# Patient Record
Sex: FEMALE | Race: WHITE | NOT HISPANIC OR LATINO | Employment: FULL TIME | URBAN - METROPOLITAN AREA
[De-identification: names, ages, dates, MRNs, and addresses within clinical notes are randomized per-mention and may not be internally consistent; named-entity substitution may affect disease eponyms.]

---

## 2017-03-06 RX ORDER — LISINOPRIL 20 MG/1
TABLET ORAL
Qty: 90 TABLET | Refills: 0 | OUTPATIENT
Start: 2017-03-06

## 2017-03-09 ENCOUNTER — OFFICE VISIT (OUTPATIENT)
Dept: FAMILY MEDICINE CLINIC | Facility: CLINIC | Age: 52
End: 2017-03-09

## 2017-03-09 VITALS
BODY MASS INDEX: 20.83 KG/M2 | HEIGHT: 69 IN | DIASTOLIC BLOOD PRESSURE: 70 MMHG | RESPIRATION RATE: 16 BRPM | SYSTOLIC BLOOD PRESSURE: 110 MMHG | WEIGHT: 140.6 LBS | HEART RATE: 84 BPM | OXYGEN SATURATION: 98 %

## 2017-03-09 DIAGNOSIS — F41.9 ANXIETY: ICD-10-CM

## 2017-03-09 DIAGNOSIS — I10 ESSENTIAL HYPERTENSION: Primary | ICD-10-CM

## 2017-03-09 PROCEDURE — 99213 OFFICE O/P EST LOW 20 MIN: CPT | Performed by: FAMILY MEDICINE

## 2017-03-09 RX ORDER — LISINOPRIL 20 MG/1
20 TABLET ORAL DAILY
Qty: 90 TABLET | Refills: 3 | Status: SHIPPED | OUTPATIENT
Start: 2017-03-09 | End: 2018-02-21 | Stop reason: SDUPTHER

## 2017-03-09 NOTE — PROGRESS NOTES
"Subjective   Vesna Carl is a 51 y.o. female.     Hypertension   This is a chronic problem. The problem is controlled. Associated symptoms include anxiety. Pertinent negatives include no chest pain, headaches, palpitations, peripheral edema or shortness of breath. Risk factors for coronary artery disease include post-menopausal state and stress. Past treatments include ACE inhibitors. The current treatment provides significant improvement. Compliance problems include exercise.  There is no history of angina, kidney disease or CAD/MI.   Anxiety   Presents for follow-up visit. Symptoms include excessive worry and nervous/anxious behavior. Patient reports no chest pain, confusion, decreased concentration, dizziness, muscle tension, nausea, palpitations, shortness of breath or suicidal ideas. Symptoms occur occasionally. The severity of symptoms is moderate (Recently learned her father has terminal cancer). The quality of sleep is fair. Nighttime awakenings: occasional.            The following portions of the patient's history were reviewed and updated as appropriate: allergies, current medications, past social history and problem list.    Review of Systems   Constitutional: Negative for appetite change, fatigue and unexpected weight change.   Respiratory: Negative for cough, chest tightness and shortness of breath.    Cardiovascular: Negative for chest pain, palpitations and leg swelling.   Gastrointestinal: Negative for abdominal pain, diarrhea and nausea.   Skin: Negative for color change and rash.   Neurological: Negative for dizziness, tremors, syncope, weakness, light-headedness and headaches.   Psychiatric/Behavioral: Negative for agitation, behavioral problems, confusion, decreased concentration, dysphoric mood, sleep disturbance and suicidal ideas. The patient is nervous/anxious.        Objective   Visit Vitals   • /70   • Pulse 84   • Resp 16   • Ht 69\" (175.3 cm)   • Wt 140 lb 9.6 oz (63.8 kg)   • SpO2 " 98%   • BMI 20.76 kg/m2     Physical Exam   Constitutional: She is oriented to person, place, and time. She appears well-developed and well-nourished. She is cooperative. No distress.   HENT:   Head: Normocephalic and atraumatic.   Right Ear: External ear normal.   Left Ear: External ear normal.   Nose: Nose normal.   Mouth/Throat: Oropharynx is clear and moist.   Eyes: Conjunctivae are normal. Pupils are equal, round, and reactive to light. No scleral icterus.   Neck: Neck supple. No JVD present. Carotid bruit is not present. No thyromegaly present.   Cardiovascular: Normal rate, regular rhythm, normal heart sounds and intact distal pulses.    No murmur heard.  Pulmonary/Chest: Effort normal and breath sounds normal. No respiratory distress. She has no rales. She exhibits no tenderness.   Abdominal: There is no hepatosplenomegaly.   Musculoskeletal: Normal range of motion.   Neurological: She is alert and oriented to person, place, and time.   No focal deficits no lateralizing signs   Skin: Skin is warm and dry. No rash noted. She is not diaphoretic.   Psychiatric: She has a normal mood and affect. Her behavior is normal. Cognition and memory are normal.   Nursing note and vitals reviewed.      Assessment/Plan   Problem List Items Addressed This Visit        Cardiovascular and Mediastinum    Essential hypertension - Primary    Relevant Medications    lisinopril (PRINIVIL,ZESTRIL) 20 MG tablet       Other    Anxiety    Relevant Medications    Vortioxetine HBr (TRINTELLIX) 10 MG tablet          New Medications Ordered This Visit   Medications   • lisinopril (PRINIVIL,ZESTRIL) 20 MG tablet     Sig: Take 1 tablet by mouth Daily.     Dispense:  90 tablet     Refill:  3   • Vortioxetine HBr (TRINTELLIX) 10 MG tablet     Sig: Take 10 mg by mouth Daily.     Dispense:  90 tablet     Refill:  1

## 2017-10-02 DIAGNOSIS — F41.9 ANXIETY: ICD-10-CM

## 2017-10-02 RX ORDER — VORTIOXETINE 10 MG/1
TABLET, FILM COATED ORAL
Qty: 90 TABLET | Refills: 1 | OUTPATIENT
Start: 2017-10-02

## 2017-10-03 DIAGNOSIS — F41.9 ANXIETY: ICD-10-CM

## 2017-10-03 RX ORDER — VORTIOXETINE 10 MG/1
TABLET, FILM COATED ORAL
Qty: 90 TABLET | Refills: 1 | OUTPATIENT
Start: 2017-10-03

## 2017-10-10 ENCOUNTER — OFFICE VISIT (OUTPATIENT)
Dept: FAMILY MEDICINE CLINIC | Facility: CLINIC | Age: 52
End: 2017-10-10

## 2017-10-10 VITALS
BODY MASS INDEX: 21.42 KG/M2 | WEIGHT: 144.6 LBS | DIASTOLIC BLOOD PRESSURE: 78 MMHG | HEART RATE: 73 BPM | OXYGEN SATURATION: 99 % | HEIGHT: 69 IN | SYSTOLIC BLOOD PRESSURE: 116 MMHG | RESPIRATION RATE: 16 BRPM

## 2017-10-10 DIAGNOSIS — I10 ESSENTIAL HYPERTENSION: Primary | ICD-10-CM

## 2017-10-10 DIAGNOSIS — F41.9 ANXIETY: ICD-10-CM

## 2017-10-10 PROCEDURE — 99213 OFFICE O/P EST LOW 20 MIN: CPT | Performed by: FAMILY MEDICINE

## 2017-10-10 RX ORDER — LORAZEPAM 1 MG/1
1 TABLET ORAL 2 TIMES DAILY PRN
Qty: 60 TABLET | Refills: 1
Start: 2017-10-10 | End: 2018-02-20 | Stop reason: SDUPTHER

## 2017-10-11 NOTE — PROGRESS NOTES
Subjective   Vesna Carl is a 51 y.o. female.     Hypertension   This is a chronic problem. The current episode started more than 1 year ago. The problem is controlled. Associated symptoms include anxiety. Pertinent negatives include no chest pain, headaches, palpitations, peripheral edema or shortness of breath. Risk factors for coronary artery disease include post-menopausal state and stress. Past treatments include ACE inhibitors. The current treatment provides significant improvement. Compliance problems include exercise.  There is no history of angina, kidney disease or CAD/MI.   Anxiety   Presents for follow-up (feeling better recent stress level improving) visit. Symptoms include nervous/anxious behavior. Patient reports no chest pain, confusion, decreased concentration, dizziness, excessive worry, muscle tension, nausea, palpitations, shortness of breath or suicidal ideas. Symptoms occur occasionally. The severity of symptoms is moderate (Recently learned her father has terminal cancer). The quality of sleep is fair. Nighttime awakenings: occasional.            The following portions of the patient's history were reviewed and updated as appropriate: allergies, current medications, past social history and problem list.    Review of Systems   Constitutional: Negative for appetite change, fatigue and unexpected weight change.   Respiratory: Negative for cough, chest tightness and shortness of breath.    Cardiovascular: Negative for chest pain, palpitations and leg swelling.   Gastrointestinal: Negative for abdominal pain, diarrhea and nausea.   Skin: Negative for color change and rash.   Neurological: Negative for dizziness, tremors, syncope, weakness, light-headedness and headaches.   Psychiatric/Behavioral: Positive for dysphoric mood and sleep disturbance. Negative for agitation, behavioral problems, confusion, decreased concentration and suicidal ideas. The patient is nervous/anxious.        Objective   BP  "116/78  Pulse 73  Resp 16  Ht 69\" (175.3 cm)  Wt 144 lb 9.6 oz (65.6 kg)  SpO2 99%  BMI 21.35 kg/m2  Physical Exam   Constitutional: She is oriented to person, place, and time. She appears well-developed and well-nourished. She is cooperative.   HENT:   Head: Normocephalic and atraumatic.   Eyes: Conjunctivae are normal. Pupils are equal, round, and reactive to light. No scleral icterus.   Neck: Neck supple. Carotid bruit is not present. No thyromegaly present.   Cardiovascular: Normal rate, regular rhythm and normal heart sounds.    Pulmonary/Chest: Effort normal and breath sounds normal.   Abdominal: There is no hepatosplenomegaly.   Musculoskeletal: Normal range of motion. She exhibits no edema.   Neurological: She is alert and oriented to person, place, and time.   No focal deficits no lateralizing signs   Skin: Skin is warm and dry. No rash noted.   Psychiatric: She has a normal mood and affect. Her behavior is normal. Cognition and memory are normal.   Nursing note and vitals reviewed.      Assessment/Plan   Problem List Items Addressed This Visit     Essential hypertension - Primary    Anxiety    Relevant Medications    Vortioxetine HBr (TRINTELLIX) 10 MG tablet          New Medications Ordered This Visit   Medications   • Vortioxetine HBr (TRINTELLIX) 10 MG tablet     Sig: Take 10 mg by mouth Daily.     Dispense:  90 tablet     Refill:  1   • LORazepam (ATIVAN) 1 MG tablet     Sig: Take 1 tablet by mouth 2 (Two) Times a Day As Needed for Anxiety.     Dispense:  60 tablet     Refill:  1     RTC FASTING         "

## 2017-10-13 ENCOUNTER — TELEPHONE (OUTPATIENT)
Dept: FAMILY MEDICINE CLINIC | Facility: CLINIC | Age: 52
End: 2017-10-13

## 2017-10-13 NOTE — TELEPHONE ENCOUNTER
----- Message from Michelle Azevedo sent at 10/12/2017  4:49 PM EDT -----  Contact: [phone]  [facility] [place]   Patient pharmacy needs a PA for vortioxetine for her. Has that been started?    PA was approved called informed pt and pharmacy.  RODRÍGUEZ ALVARADO

## 2018-02-20 ENCOUNTER — OFFICE VISIT (OUTPATIENT)
Dept: FAMILY MEDICINE CLINIC | Facility: CLINIC | Age: 53
End: 2018-02-20

## 2018-02-20 VITALS
RESPIRATION RATE: 16 BRPM | WEIGHT: 139 LBS | DIASTOLIC BLOOD PRESSURE: 80 MMHG | HEIGHT: 69 IN | SYSTOLIC BLOOD PRESSURE: 112 MMHG | OXYGEN SATURATION: 98 % | HEART RATE: 62 BPM | BODY MASS INDEX: 20.59 KG/M2

## 2018-02-20 DIAGNOSIS — I10 ESSENTIAL HYPERTENSION: Primary | ICD-10-CM

## 2018-02-20 DIAGNOSIS — F41.9 ANXIETY: ICD-10-CM

## 2018-02-20 PROCEDURE — 99213 OFFICE O/P EST LOW 20 MIN: CPT | Performed by: FAMILY MEDICINE

## 2018-02-20 RX ORDER — LORAZEPAM 1 MG/1
1 TABLET ORAL 2 TIMES DAILY PRN
Qty: 60 TABLET | Refills: 1
Start: 2018-02-20 | End: 2018-10-09 | Stop reason: SDUPTHER

## 2018-02-20 NOTE — PROGRESS NOTES
Subjective   Vesna Carl is a 52 y.o. female.     Hypertension   This is a chronic problem. The current episode started more than 1 year ago. The problem is unchanged. The problem is controlled. Associated symptoms include anxiety. Pertinent negatives include no chest pain, headaches, malaise/fatigue, palpitations, peripheral edema or shortness of breath. Risk factors for coronary artery disease include post-menopausal state and stress. Past treatments include ACE inhibitors. The current treatment provides significant improvement. There are no compliance problems.  There is no history of angina, kidney disease or CAD/MI.   Anxiety   Presents for follow-up (Continuing to do well is considering coming off  an indication.) visit. Symptoms include nervous/anxious behavior. Patient reports no chest pain, confusion, decreased concentration, dizziness, excessive worry, muscle tension, nausea, palpitations, shortness of breath or suicidal ideas. Symptoms occur occasionally. The severity of symptoms is moderate (Recently learned her father has terminal cancer). The quality of sleep is fair. Nighttime awakenings: occasional.            The following portions of the patient's history were reviewed and updated as appropriate: allergies, current medications, past social history and problem list.    Review of Systems   Constitutional: Negative for appetite change, fatigue, malaise/fatigue and unexpected weight change.   HENT: Negative for congestion and sinus pressure.    Respiratory: Negative for cough, chest tightness and shortness of breath.    Cardiovascular: Negative for chest pain, palpitations and leg swelling.   Gastrointestinal: Negative for abdominal pain, diarrhea and nausea.   Genitourinary: Negative for dysuria and hematuria.   Skin: Negative for color change and rash.   Neurological: Negative for dizziness, tremors, syncope, weakness, light-headedness and headaches.   Psychiatric/Behavioral: Positive for dysphoric  "mood and sleep disturbance. Negative for agitation, behavioral problems, confusion, decreased concentration and suicidal ideas. The patient is nervous/anxious.        Objective   /80  Pulse 62  Resp 16  Ht 175.3 cm (69\")  Wt 63 kg (139 lb)  SpO2 98%  BMI 20.53 kg/m2  Physical Exam   Constitutional: She is oriented to person, place, and time. She appears well-developed and well-nourished. She is cooperative.   HENT:   Head: Normocephalic.   Right Ear: External ear normal.   Left Ear: External ear normal.   Nose: Nose normal.   Mouth/Throat: Oropharynx is clear and moist.   Eyes: Conjunctivae are normal. Pupils are equal, round, and reactive to light. No scleral icterus.   Neck: Neck supple. Carotid bruit is not present. No thyromegaly present.   Cardiovascular: Normal rate and regular rhythm.    Pulmonary/Chest: Effort normal and breath sounds normal.   Abdominal: There is no hepatosplenomegaly.   Musculoskeletal: Normal range of motion.   Neurological: She is alert and oriented to person, place, and time.   No focal deficits no lateralizing signs   Skin: Skin is warm and dry. No rash noted.   Psychiatric: She has a normal mood and affect. Her behavior is normal. Judgment and thought content normal. Cognition and memory are normal.   Nursing note and vitals reviewed.      Assessment/Plan   Problem List Items Addressed This Visit     Essential hypertension - Primary    Anxiety    Relevant Medications    Vortioxetine HBr (TRINTELLIX) 10 MG tablet          New Medications Ordered This Visit   Medications   • Vortioxetine HBr (TRINTELLIX) 10 MG tablet     Sig: Take 10 mg by mouth Daily.     Dispense:  90 tablet     Refill:  1   • LORazepam (ATIVAN) 1 MG tablet     Sig: Take 1 tablet by mouth 2 (Two) Times a Day As Needed for Anxiety.     Dispense:  60 tablet     Refill:  1       Patient is generally doing well a encouraged her to stay on her current medication.       "

## 2018-02-22 RX ORDER — LISINOPRIL 20 MG/1
TABLET ORAL
Qty: 90 TABLET | Refills: 1 | Status: SHIPPED | OUTPATIENT
Start: 2018-02-22 | End: 2018-08-23 | Stop reason: SDUPTHER

## 2018-08-23 RX ORDER — LISINOPRIL 20 MG/1
TABLET ORAL
Qty: 90 TABLET | Refills: 0 | Status: SHIPPED | OUTPATIENT
Start: 2018-08-23 | End: 2018-11-12 | Stop reason: SDUPTHER

## 2018-08-24 RX ORDER — LISINOPRIL 20 MG/1
TABLET ORAL
Qty: 90 TABLET | Refills: 1 | OUTPATIENT
Start: 2018-08-24

## 2018-09-21 DIAGNOSIS — F41.9 ANXIETY: ICD-10-CM

## 2018-09-21 RX ORDER — VORTIOXETINE 10 MG/1
TABLET, FILM COATED ORAL
Qty: 90 TABLET | Refills: 1 | OUTPATIENT
Start: 2018-09-21

## 2018-10-04 DIAGNOSIS — F41.9 ANXIETY: ICD-10-CM

## 2018-10-05 RX ORDER — VORTIOXETINE 10 MG/1
TABLET, FILM COATED ORAL
Qty: 90 TABLET | Refills: 1 | OUTPATIENT
Start: 2018-10-05

## 2018-10-09 ENCOUNTER — OFFICE VISIT (OUTPATIENT)
Dept: FAMILY MEDICINE CLINIC | Facility: CLINIC | Age: 53
End: 2018-10-09

## 2018-10-09 VITALS
BODY MASS INDEX: 20.86 KG/M2 | WEIGHT: 140.8 LBS | OXYGEN SATURATION: 98 % | HEART RATE: 75 BPM | DIASTOLIC BLOOD PRESSURE: 78 MMHG | RESPIRATION RATE: 16 BRPM | HEIGHT: 69 IN | SYSTOLIC BLOOD PRESSURE: 116 MMHG

## 2018-10-09 DIAGNOSIS — F41.9 ANXIETY: ICD-10-CM

## 2018-10-09 DIAGNOSIS — I10 ESSENTIAL HYPERTENSION: Primary | ICD-10-CM

## 2018-10-09 LAB
ALBUMIN SERPL-MCNC: 4.42 G/DL (ref 3.2–4.8)
ALBUMIN/GLOB SERPL: 1.9 G/DL (ref 1.5–2.5)
ALP SERPL-CCNC: 30 U/L (ref 25–100)
ALT SERPL W P-5'-P-CCNC: 16 U/L (ref 7–40)
ANION GAP SERPL CALCULATED.3IONS-SCNC: 6 MMOL/L (ref 3–11)
ARTICHOKE IGE QN: 109 MG/DL (ref 0–130)
AST SERPL-CCNC: 20 U/L (ref 0–33)
BASOPHILS # BLD AUTO: 0.04 10*3/MM3 (ref 0–0.2)
BASOPHILS NFR BLD AUTO: 0.7 % (ref 0–1)
BILIRUB SERPL-MCNC: 0.3 MG/DL (ref 0.3–1.2)
BUN BLD-MCNC: 11 MG/DL (ref 9–23)
BUN/CREAT SERPL: 13.8 (ref 7–25)
CALCIUM SPEC-SCNC: 9.2 MG/DL (ref 8.7–10.4)
CHLORIDE SERPL-SCNC: 107 MMOL/L (ref 99–109)
CHOLEST SERPL-MCNC: 189 MG/DL (ref 0–200)
CO2 SERPL-SCNC: 27 MMOL/L (ref 20–31)
CREAT BLD-MCNC: 0.8 MG/DL (ref 0.6–1.3)
DEPRECATED RDW RBC AUTO: 45.4 FL (ref 37–54)
EOSINOPHIL # BLD AUTO: 0.21 10*3/MM3 (ref 0–0.3)
EOSINOPHIL NFR BLD AUTO: 3.6 % (ref 0–3)
ERYTHROCYTE [DISTWIDTH] IN BLOOD BY AUTOMATED COUNT: 14.8 % (ref 11.3–14.5)
FSH SERPL-ACNC: 38.5 MIU/ML
GFR SERPL CREATININE-BSD FRML MDRD: 76 ML/MIN/1.73
GLOBULIN UR ELPH-MCNC: 2.4 GM/DL
GLUCOSE BLD-MCNC: 86 MG/DL (ref 70–100)
HCT VFR BLD AUTO: 35.6 % (ref 34.5–44)
HDLC SERPL-MCNC: 78 MG/DL (ref 40–60)
HGB BLD-MCNC: 11.3 G/DL (ref 11.5–15.5)
IMM GRANULOCYTES # BLD: 0.01 10*3/MM3 (ref 0–0.03)
IMM GRANULOCYTES NFR BLD: 0.2 % (ref 0–0.6)
LH SERPL-ACNC: 17.2 MIU/ML
LYMPHOCYTES # BLD AUTO: 1.39 10*3/MM3 (ref 0.6–4.8)
LYMPHOCYTES NFR BLD AUTO: 24.1 % (ref 24–44)
MCH RBC QN AUTO: 26.8 PG (ref 27–31)
MCHC RBC AUTO-ENTMCNC: 31.7 G/DL (ref 32–36)
MCV RBC AUTO: 84.4 FL (ref 80–99)
MONOCYTES # BLD AUTO: 0.5 10*3/MM3 (ref 0–1)
MONOCYTES NFR BLD AUTO: 8.7 % (ref 0–12)
NEUTROPHILS # BLD AUTO: 3.63 10*3/MM3 (ref 1.5–8.3)
NEUTROPHILS NFR BLD AUTO: 62.9 % (ref 41–71)
PLATELET # BLD AUTO: 399 10*3/MM3 (ref 150–450)
PMV BLD AUTO: 9.9 FL (ref 6–12)
POTASSIUM BLD-SCNC: 4.4 MMOL/L (ref 3.5–5.5)
PROT SERPL-MCNC: 6.8 G/DL (ref 5.7–8.2)
RBC # BLD AUTO: 4.22 10*6/MM3 (ref 3.89–5.14)
SODIUM BLD-SCNC: 140 MMOL/L (ref 132–146)
T4 FREE SERPL-MCNC: 1.07 NG/DL (ref 0.89–1.76)
TRIGL SERPL-MCNC: 58 MG/DL (ref 0–150)
TSH SERPL DL<=0.05 MIU/L-ACNC: 1.18 MIU/ML (ref 0.35–5.35)
WBC NRBC COR # BLD: 5.77 10*3/MM3 (ref 3.5–10.8)

## 2018-10-09 PROCEDURE — 84439 ASSAY OF FREE THYROXINE: CPT | Performed by: FAMILY MEDICINE

## 2018-10-09 PROCEDURE — 83001 ASSAY OF GONADOTROPIN (FSH): CPT | Performed by: FAMILY MEDICINE

## 2018-10-09 PROCEDURE — 36415 COLL VENOUS BLD VENIPUNCTURE: CPT | Performed by: FAMILY MEDICINE

## 2018-10-09 PROCEDURE — 84443 ASSAY THYROID STIM HORMONE: CPT | Performed by: FAMILY MEDICINE

## 2018-10-09 PROCEDURE — 90471 IMMUNIZATION ADMIN: CPT | Performed by: FAMILY MEDICINE

## 2018-10-09 PROCEDURE — 83002 ASSAY OF GONADOTROPIN (LH): CPT | Performed by: FAMILY MEDICINE

## 2018-10-09 PROCEDURE — 99213 OFFICE O/P EST LOW 20 MIN: CPT | Performed by: FAMILY MEDICINE

## 2018-10-09 PROCEDURE — 80053 COMPREHEN METABOLIC PANEL: CPT | Performed by: FAMILY MEDICINE

## 2018-10-09 PROCEDURE — 90632 HEPA VACCINE ADULT IM: CPT | Performed by: FAMILY MEDICINE

## 2018-10-09 PROCEDURE — 85025 COMPLETE CBC W/AUTO DIFF WBC: CPT | Performed by: FAMILY MEDICINE

## 2018-10-09 PROCEDURE — 80061 LIPID PANEL: CPT | Performed by: FAMILY MEDICINE

## 2018-10-09 RX ORDER — LORAZEPAM 1 MG/1
1 TABLET ORAL 2 TIMES DAILY PRN
Qty: 60 TABLET | Refills: 1
Start: 2018-10-09 | End: 2019-04-10 | Stop reason: SDUPTHER

## 2018-10-09 NOTE — PROGRESS NOTES
Subjective   Vesna Carl is a 52 y.o. female.     Hypertension   This is a chronic problem. The current episode started more than 1 year ago. The problem is unchanged. The problem is controlled. Associated symptoms include anxiety. Pertinent negatives include no chest pain, headaches, malaise/fatigue, palpitations, peripheral edema or shortness of breath. Risk factors for coronary artery disease include post-menopausal state and stress. Current antihypertension treatment includes ACE inhibitors. The current treatment provides significant improvement. There are no compliance problems.  There is no history of angina, kidney disease or CAD/MI.   Anxiety   Presents for follow-up (Continuing to do well ) visit. Symptoms include irritability and nervous/anxious behavior. Patient reports no chest pain, confusion, decreased concentration, dizziness, excessive worry, muscle tension, nausea, palpitations, shortness of breath or suicidal ideas. Symptoms occur occasionally. The severity of symptoms is moderate (Recently learned her father has terminal cancer). The quality of sleep is fair. Nighttime awakenings: occasional.            The following portions of the patient's history were reviewed and updated as appropriate: allergies, current medications, past social history and problem list.    Review of Systems   Constitutional: Positive for irritability. Negative for fatigue, malaise/fatigue and unexpected weight change.   HENT: Negative for congestion and postnasal drip.    Respiratory: Negative for cough, chest tightness and shortness of breath.    Cardiovascular: Negative for chest pain, palpitations and leg swelling.   Gastrointestinal: Negative for nausea.   Endocrine: Positive for heat intolerance.   Genitourinary: Negative for dysuria and hematuria.   Musculoskeletal: Negative for arthralgias and myalgias.   Skin: Negative for color change and rash.   Neurological: Negative for dizziness, syncope, weakness and headaches.  "  Psychiatric/Behavioral: Negative for confusion, decreased concentration and suicidal ideas. The patient is nervous/anxious.         Traveling to [place] and [place] for work       Objective   /78   Pulse 75   Resp 16   Ht 175.3 cm (69\")   Wt 63.9 kg (140 lb 12.8 oz)   SpO2 98%   BMI 20.79 kg/m²   Physical Exam   Constitutional: She is oriented to person, place, and time. She appears well-developed and well-nourished. She is cooperative.   HENT:   Head: Normocephalic.   Right Ear: External ear normal.   Left Ear: External ear normal.   Nose: Nose normal.   Mouth/Throat: Oropharynx is clear and moist.   Eyes: Pupils are equal, round, and reactive to light. Conjunctivae are normal. No scleral icterus.   Neck: Neck supple. Carotid bruit is not present. No thyromegaly present.   Cardiovascular: Normal rate, regular rhythm and normal heart sounds.    Pulmonary/Chest: Effort normal and breath sounds normal. She has no wheezes. She has no rales.   Abdominal: There is no hepatosplenomegaly.   Musculoskeletal: Normal range of motion.   Neurological: She is alert and oriented to person, place, and time.   No focal deficits no lateralizing signs   Skin: Skin is warm and dry. No rash noted.   Psychiatric: She has a normal mood and affect. Her behavior is normal. Judgment and thought content normal. Cognition and memory are normal.   Nursing note and vitals reviewed.      Assessment/Plan   Problem List Items Addressed This Visit     Essential hypertension - Primary    Relevant Orders    Comprehensive Metabolic Panel    Lipid Panel    TSH    CBC & Differential    T4, Free    FSH & LH    CBC Auto Differential    Anxiety    Relevant Orders    FSH & LH          No orders of the defined types were placed in this encounter.    Follow up soon with her gynecologist she is a good candidate for hormone replacement therapy in my opinion.  She agreed to schedule a colonoscopy after she returns.  Hepatitis A #1 given " today.Flu vaccine at work.

## 2018-10-12 DIAGNOSIS — F41.9 ANXIETY: ICD-10-CM

## 2018-11-13 RX ORDER — LISINOPRIL 20 MG/1
TABLET ORAL
Qty: 90 TABLET | Refills: 1 | Status: SHIPPED | OUTPATIENT
Start: 2018-11-13 | End: 2019-04-10 | Stop reason: SDUPTHER

## 2019-04-10 ENCOUNTER — OFFICE VISIT (OUTPATIENT)
Dept: FAMILY MEDICINE CLINIC | Facility: CLINIC | Age: 54
End: 2019-04-10

## 2019-04-10 VITALS
HEART RATE: 73 BPM | RESPIRATION RATE: 16 BRPM | SYSTOLIC BLOOD PRESSURE: 120 MMHG | BODY MASS INDEX: 21.38 KG/M2 | OXYGEN SATURATION: 99 % | WEIGHT: 144.38 LBS | TEMPERATURE: 97.6 F | HEIGHT: 69 IN | DIASTOLIC BLOOD PRESSURE: 90 MMHG

## 2019-04-10 DIAGNOSIS — I10 ESSENTIAL HYPERTENSION: Primary | ICD-10-CM

## 2019-04-10 DIAGNOSIS — F41.9 ANXIETY: ICD-10-CM

## 2019-04-10 PROCEDURE — 99214 OFFICE O/P EST MOD 30 MIN: CPT | Performed by: FAMILY MEDICINE

## 2019-04-10 RX ORDER — LORAZEPAM 1 MG/1
1 TABLET ORAL 2 TIMES DAILY PRN
Qty: 60 TABLET | Refills: 1
Start: 2019-04-10 | End: 2020-01-09 | Stop reason: SDUPTHER

## 2019-04-10 RX ORDER — LISINOPRIL 20 MG/1
20 TABLET ORAL DAILY
Qty: 90 TABLET | Refills: 3 | Status: SHIPPED | OUTPATIENT
Start: 2019-04-10 | End: 2020-01-09

## 2019-04-10 NOTE — PROGRESS NOTES
Subjective   Vesna Carl is a 53 y.o. female.     Hypertension   This is a chronic problem. The current episode started more than 1 year ago. The problem is unchanged. The problem is controlled. Associated symptoms include anxiety. Pertinent negatives include no chest pain, headaches, malaise/fatigue, palpitations, peripheral edema or shortness of breath. Risk factors for coronary artery disease include post-menopausal state and stress. Current antihypertension treatment includes ACE inhibitors. The current treatment provides significant improvement. There are no compliance problems.  There is no history of angina, kidney disease or CAD/MI.   Anxiety   Presents for follow-up (Continuing to do well ) visit. Symptoms include irritability and nervous/anxious behavior. Patient reports no chest pain, confusion, decreased concentration, dizziness, excessive worry, muscle tension, nausea, palpitations, shortness of breath or suicidal ideas. Symptoms occur occasionally. The severity of symptoms is moderate (Recently learned her father has terminal cancer). The quality of sleep is fair. Nighttime awakenings: occasional.            The following portions of the patient's history were reviewed and updated as appropriate: allergies, current medications, past social history and problem list.    Review of Systems   Constitutional: Positive for irritability. Negative for activity change, fatigue and malaise/fatigue.   HENT: Negative for congestion and sore throat.    Eyes: Negative for pain and visual disturbance.   Respiratory: Negative for chest tightness and shortness of breath.    Cardiovascular: Negative for chest pain, palpitations and leg swelling.   Gastrointestinal: Negative for diarrhea, nausea and vomiting.   Genitourinary: Negative for dysuria and hematuria.   Neurological: Negative for dizziness, syncope and headaches.   Psychiatric/Behavioral: Negative for confusion, decreased concentration and suicidal ideas. The  "patient is nervous/anxious.        Objective   /90   Pulse 73   Temp 97.6 °F (36.4 °C)   Resp 16   Ht 175.3 cm (69.02\")   Wt 65.5 kg (144 lb 6 oz)   LMP 03/06/2019 (Approximate)   SpO2 99%   BMI 21.31 kg/m²   Physical Exam   Constitutional: She is oriented to person, place, and time. She appears well-developed and well-nourished. She is cooperative.   HENT:   Head: Normocephalic.   Right Ear: External ear normal.   Left Ear: External ear normal.   Nose: Nose normal.   Mouth/Throat: Oropharynx is clear and moist.   Eyes: Conjunctivae are normal. Pupils are equal, round, and reactive to light. No scleral icterus.   Neck: Neck supple. No JVD present. Carotid bruit is not present. No thyromegaly present.   Cardiovascular: Normal rate, regular rhythm and normal heart sounds.   Pulmonary/Chest: Effort normal and breath sounds normal.   Abdominal: There is no hepatosplenomegaly.   Musculoskeletal: Normal range of motion.   Lymphadenopathy:     She has no cervical adenopathy.   Neurological: She is alert and oriented to person, place, and time. She displays normal reflexes. No cranial nerve deficit. Coordination normal.   No focal deficits no lateralizing signs   Skin: Skin is warm and dry. No rash noted.   Psychiatric: She has a normal mood and affect. Cognition and memory are normal.   Nursing note and vitals reviewed.      Assessment/Plan   Problem List Items Addressed This Visit        Active Problems    Essential hypertension - Primary    Relevant Medications    lisinopril (PRINIVIL,ZESTRIL) 20 MG tablet    LORazepam (ATIVAN) 1 MG tablet    Anxiety    Relevant Medications    Vortioxetine HBr (TRINTELLIX) 10 MG tablet    LORazepam (ATIVAN) 1 MG tablet          New Medications Ordered This Visit   Medications   • lisinopril (PRINIVIL,ZESTRIL) 20 MG tablet     Sig: Take 1 tablet by mouth Daily.     Dispense:  90 tablet     Refill:  3   • Vortioxetine HBr (TRINTELLIX) 10 MG tablet     Sig: Take 10 mg by mouth " Daily.     Dispense:  90 tablet     Refill:  1   • LORazepam (ATIVAN) 1 MG tablet     Sig: Take 1 tablet by mouth 2 (Two) Times a Day As Needed for Anxiety.     Dispense:  60 tablet     Refill:  1

## 2019-10-06 DIAGNOSIS — F41.9 ANXIETY: ICD-10-CM

## 2019-10-07 RX ORDER — VORTIOXETINE 10 MG/1
TABLET, FILM COATED ORAL
Qty: 90 TABLET | Refills: 0 | Status: SHIPPED | OUTPATIENT
Start: 2019-10-07 | End: 2019-10-11 | Stop reason: SDUPTHER

## 2019-10-10 DIAGNOSIS — F41.9 ANXIETY: ICD-10-CM

## 2019-10-11 RX ORDER — VORTIOXETINE 10 MG/1
TABLET, FILM COATED ORAL
Qty: 90 TABLET | Refills: 0 | Status: SHIPPED | OUTPATIENT
Start: 2019-10-11 | End: 2020-01-09 | Stop reason: SDUPTHER

## 2020-01-09 ENCOUNTER — OFFICE VISIT (OUTPATIENT)
Dept: FAMILY MEDICINE CLINIC | Facility: CLINIC | Age: 55
End: 2020-01-09

## 2020-01-09 VITALS
SYSTOLIC BLOOD PRESSURE: 132 MMHG | DIASTOLIC BLOOD PRESSURE: 88 MMHG | HEART RATE: 80 BPM | WEIGHT: 146.6 LBS | BODY MASS INDEX: 21.71 KG/M2 | OXYGEN SATURATION: 98 % | HEIGHT: 69 IN | RESPIRATION RATE: 16 BRPM

## 2020-01-09 DIAGNOSIS — F41.9 ANXIETY: ICD-10-CM

## 2020-01-09 DIAGNOSIS — I10 ESSENTIAL HYPERTENSION: Primary | ICD-10-CM

## 2020-01-09 PROCEDURE — 99213 OFFICE O/P EST LOW 20 MIN: CPT | Performed by: FAMILY MEDICINE

## 2020-01-09 RX ORDER — LORAZEPAM 1 MG/1
1 TABLET ORAL 2 TIMES DAILY PRN
Qty: 60 TABLET | Refills: 2
Start: 2020-01-09 | End: 2020-07-15 | Stop reason: SDUPTHER

## 2020-01-09 RX ORDER — LISINOPRIL 40 MG/1
40 TABLET ORAL DAILY
Qty: 90 TABLET | Refills: 1 | Status: SHIPPED | OUTPATIENT
Start: 2020-01-09 | End: 2020-05-27

## 2020-01-09 NOTE — PROGRESS NOTES
Subjective   Vesna Carl is a 54 y.o. female.     Anxiety   Presents for follow-up (Continuing to do well ) visit. Symptoms include irritability and nervous/anxious behavior. Patient reports no chest pain, confusion, decreased concentration, dizziness, excessive worry, muscle tension, nausea, palpitations, shortness of breath or suicidal ideas. Symptoms occur occasionally. The severity of symptoms is moderate (Recently learned her father has terminal cancer). The quality of sleep is fair. Nighttime awakenings: occasional.       Hypertension   This is a chronic (Pressure readings are up at home systolic in the 150s) problem. The current episode started more than 1 year ago. The problem is unchanged. The problem is uncontrolled. Associated symptoms include anxiety. Pertinent negatives include no chest pain, headaches, malaise/fatigue, palpitations, peripheral edema or shortness of breath. Risk factors for coronary artery disease include post-menopausal state and stress. Current antihypertension treatment includes ACE inhibitors. The current treatment provides significant improvement. There are no compliance problems.  There is no history of angina, kidney disease or CAD/MI.        The following portions of the patient's history were reviewed and updated as appropriate: allergies, current medications, past social history and problem list.    Review of Systems   Constitutional: Positive for irritability. Negative for activity change, fatigue and malaise/fatigue.   HENT: Negative for congestion and sore throat.    Eyes: Negative for pain and visual disturbance.   Respiratory: Negative for chest tightness and shortness of breath.    Cardiovascular: Negative for chest pain, palpitations and leg swelling.   Gastrointestinal: Negative for diarrhea, nausea and vomiting.   Genitourinary: Negative for dysuria and hematuria.   Musculoskeletal: Negative for arthralgias and myalgias.   Neurological: Negative for dizziness, syncope  "and headaches.   Psychiatric/Behavioral: Negative for confusion, decreased concentration and suicidal ideas. The patient is nervous/anxious.        Objective   /88   Pulse 80   Resp 16   Ht 175.3 cm (69.02\")   Wt 66.5 kg (146 lb 9.6 oz)   SpO2 98%   BMI 21.64 kg/m²   Physical Exam   Constitutional: She is oriented to person, place, and time. She appears well-developed and well-nourished. She is cooperative.   HENT:   Head: Normocephalic.   Right Ear: External ear normal.   Left Ear: External ear normal.   Nose: Nose normal.   Mouth/Throat: Oropharynx is clear and moist.   Eyes: Pupils are equal, round, and reactive to light. Conjunctivae are normal. No scleral icterus.   Neck: Neck supple. No JVD present. Carotid bruit is not present. No thyromegaly present.   Cardiovascular: Normal rate, regular rhythm, normal heart sounds and intact distal pulses.   Pulmonary/Chest: Effort normal and breath sounds normal. She has no wheezes.   Abdominal: There is no hepatosplenomegaly.   Musculoskeletal: Normal range of motion. She exhibits no edema or tenderness.   Neurological: She is alert and oriented to person, place, and time.   No focal deficits no lateralizing signs   Skin: Skin is warm and dry. No rash noted.   Psychiatric: She has a normal mood and affect. Cognition and memory are normal.   Nursing note and vitals reviewed.      Assessment/Plan   Problem List Items Addressed This Visit        Active Problems    Essential hypertension - Primary    Relevant Medications    LORazepam (ATIVAN) 1 MG tablet    lisinopril (PRINIVIL,ZESTRIL) 40 MG tablet    Anxiety    Relevant Medications    LORazepam (ATIVAN) 1 MG tablet    Vortioxetine HBr (TRINTELLIX) 10 MG tablet          New Medications Ordered This Visit   Medications   • LORazepam (ATIVAN) 1 MG tablet     Sig: Take 1 tablet by mouth 2 (Two) Times a Day As Needed for Anxiety.     Dispense:  60 tablet     Refill:  2   • lisinopril (PRINIVIL,ZESTRIL) 40 MG tablet "     Sig: Take 1 tablet by mouth Daily.     Dispense:  90 tablet     Refill:  1   • Vortioxetine HBr (TRINTELLIX) 10 MG tablet     Sig: Take 10 mg by mouth Daily.     Dispense:  30 tablet     Refill:  5

## 2020-02-24 RX ORDER — LISINOPRIL 20 MG/1
TABLET ORAL
Qty: 90 TABLET | Refills: 3 | OUTPATIENT
Start: 2020-02-24

## 2020-05-27 RX ORDER — LISINOPRIL 40 MG/1
40 TABLET ORAL DAILY
Qty: 60 TABLET | Refills: 0 | Status: SHIPPED | OUTPATIENT
Start: 2020-05-27 | End: 2020-07-15

## 2020-07-10 DIAGNOSIS — F41.9 ANXIETY: ICD-10-CM

## 2020-07-10 RX ORDER — VORTIOXETINE 10 MG/1
TABLET, FILM COATED ORAL
Qty: 30 TABLET | Refills: 0 | Status: SHIPPED | OUTPATIENT
Start: 2020-07-10 | End: 2020-07-15 | Stop reason: SDUPTHER

## 2020-07-15 ENCOUNTER — OFFICE VISIT (OUTPATIENT)
Dept: FAMILY MEDICINE CLINIC | Facility: CLINIC | Age: 55
End: 2020-07-15

## 2020-07-15 VITALS
OXYGEN SATURATION: 96 % | WEIGHT: 151 LBS | SYSTOLIC BLOOD PRESSURE: 132 MMHG | TEMPERATURE: 97.4 F | DIASTOLIC BLOOD PRESSURE: 90 MMHG | HEART RATE: 76 BPM | RESPIRATION RATE: 18 BRPM | BODY MASS INDEX: 22.36 KG/M2 | HEIGHT: 69 IN

## 2020-07-15 DIAGNOSIS — I10 ESSENTIAL HYPERTENSION: ICD-10-CM

## 2020-07-15 DIAGNOSIS — F41.9 ANXIETY: ICD-10-CM

## 2020-07-15 PROCEDURE — 99213 OFFICE O/P EST LOW 20 MIN: CPT | Performed by: FAMILY MEDICINE

## 2020-07-15 RX ORDER — LISINOPRIL 40 MG/1
TABLET ORAL
Qty: 60 TABLET | Refills: 0 | Status: SHIPPED | OUTPATIENT
Start: 2020-07-15 | End: 2020-07-15 | Stop reason: SDUPTHER

## 2020-07-15 RX ORDER — LISINOPRIL 40 MG/1
40 TABLET ORAL DAILY
Qty: 90 TABLET | Refills: 3 | Status: SHIPPED | OUTPATIENT
Start: 2020-07-15 | End: 2021-08-10 | Stop reason: SDUPTHER

## 2020-07-15 RX ORDER — TRIAMTERENE AND HYDROCHLOROTHIAZIDE 37.5; 25 MG/1; MG/1
0.5 TABLET ORAL DAILY
Qty: 45 TABLET | Refills: 1 | Status: SHIPPED | OUTPATIENT
Start: 2020-07-15 | End: 2021-01-03

## 2020-07-15 RX ORDER — LORAZEPAM 1 MG/1
1 TABLET ORAL 2 TIMES DAILY PRN
Qty: 60 TABLET | Refills: 2 | Status: SHIPPED | OUTPATIENT
Start: 2020-07-15 | End: 2020-10-15 | Stop reason: SDUPTHER

## 2020-07-16 NOTE — PROGRESS NOTES
Subjective   Vesna Carl is a 54 y.o. female.     Anxiety   Presents for follow-up visit. Symptoms include irritability and nervous/anxious behavior. Patient reports no chest pain, confusion, decreased concentration, dizziness, excessive worry, muscle tension, nausea, palpitations, shortness of breath or suicidal ideas. Primary symptoms comment: Corona virus pandemic has increased stress at work. Symptoms occur occasionally. The severity of symptoms is moderate (Recently learned her father has terminal cancer). The quality of sleep is fair. Nighttime awakenings: occasional.       Hypertension   This is a chronic (Blood pressure readings have been creeping up has had to take some extra lisinopril) problem. The current episode started more than 1 year ago. The problem is unchanged. The problem is uncontrolled. Associated symptoms include anxiety. Pertinent negatives include no chest pain, headaches, malaise/fatigue, palpitations, peripheral edema or shortness of breath. Risk factors for coronary artery disease include post-menopausal state and stress. Current antihypertension treatment includes ACE inhibitors. The current treatment provides significant improvement. There are no compliance problems.  There is no history of angina, kidney disease or CAD/MI.        The following portions of the patient's history were reviewed and updated as appropriate: allergies, current medications, past social history and problem list.    Review of Systems   Constitutional: Positive for irritability. Negative for activity change, fatigue and malaise/fatigue.   Eyes: Negative for pain and visual disturbance.   Respiratory: Negative for chest tightness and shortness of breath.    Cardiovascular: Negative for chest pain, palpitations and leg swelling.   Gastrointestinal: Negative for diarrhea, nausea and vomiting.   Genitourinary: Negative for dysuria and hematuria.   Neurological: Negative for dizziness, syncope and headaches.  "  Psychiatric/Behavioral: Negative for confusion, decreased concentration and suicidal ideas. The patient is nervous/anxious.        Objective   /90   Pulse 76   Temp 97.4 °F (36.3 °C)   Resp 18   Ht 175.3 cm (69.02\")   Wt 68.5 kg (151 lb)   LMP 07/08/2020   SpO2 96%   Breastfeeding No   BMI 22.29 kg/m²   Physical Exam   Constitutional: She is oriented to person, place, and time. She appears well-developed and well-nourished. She is cooperative.   HENT:   Head: Normocephalic and atraumatic.   Eyes: Pupils are equal, round, and reactive to light. Conjunctivae are normal. No scleral icterus.   Neck: Neck supple. Carotid bruit is not present. No thyromegaly present.   Cardiovascular: Normal rate, regular rhythm and normal heart sounds.   Pulmonary/Chest: Effort normal and breath sounds normal.   Abdominal: There is no hepatosplenomegaly.   Musculoskeletal: Normal range of motion. She exhibits no edema.   Neurological: She is alert and oriented to person, place, and time.   No focal deficits no lateralizing signs   Skin: Skin is warm and dry. No rash noted.   Psychiatric: She has a normal mood and affect. Her behavior is normal. Cognition and memory are normal.   Nursing note and vitals reviewed.      Assessment/Plan   Problem List Items Addressed This Visit        Active Problems    Essential hypertension    Relevant Medications    triamterene-hydrochlorothiazide (MAXZIDE-25) 37.5-25 MG per tablet    LORazepam (ATIVAN) 1 MG tablet    lisinopril (PRINIVIL,ZESTRIL) 40 MG tablet    Anxiety    Relevant Medications    LORazepam (ATIVAN) 1 MG tablet    Vortioxetine HBr (Trintellix) 10 MG tablet          New Medications Ordered This Visit   Medications   • triamterene-hydrochlorothiazide (MAXZIDE-25) 37.5-25 MG per tablet     Sig: Take 0.5 tablets by mouth Daily.     Dispense:  45 tablet     Refill:  1   • LORazepam (ATIVAN) 1 MG tablet     Sig: Take 1 tablet by mouth 2 (Two) Times a Day As Needed for Anxiety. "     Dispense:  60 tablet     Refill:  2   • Vortioxetine HBr (Trintellix) 10 MG tablet     Sig: Take 10 mg by mouth Daily.     Dispense:  90 tablet     Refill:  1   • lisinopril (PRINIVIL,ZESTRIL) 40 MG tablet     Sig: Take 1 tablet by mouth Daily.     Dispense:  90 tablet     Refill:  3     Monitor blood pressure and record for next office visit.

## 2020-10-15 ENCOUNTER — OFFICE VISIT (OUTPATIENT)
Dept: FAMILY MEDICINE CLINIC | Facility: CLINIC | Age: 55
End: 2020-10-15

## 2020-10-15 VITALS
HEIGHT: 69 IN | TEMPERATURE: 98.4 F | BODY MASS INDEX: 21.3 KG/M2 | WEIGHT: 143.8 LBS | DIASTOLIC BLOOD PRESSURE: 70 MMHG | OXYGEN SATURATION: 98 % | HEART RATE: 81 BPM | SYSTOLIC BLOOD PRESSURE: 102 MMHG

## 2020-10-15 DIAGNOSIS — I10 ESSENTIAL HYPERTENSION: Primary | ICD-10-CM

## 2020-10-15 DIAGNOSIS — F41.9 ANXIETY: ICD-10-CM

## 2020-10-15 PROCEDURE — 99213 OFFICE O/P EST LOW 20 MIN: CPT | Performed by: FAMILY MEDICINE

## 2020-10-15 RX ORDER — LORAZEPAM 1 MG/1
1 TABLET ORAL 2 TIMES DAILY PRN
Qty: 60 TABLET | Refills: 3 | Status: SHIPPED | OUTPATIENT
Start: 2020-10-15 | End: 2021-06-28 | Stop reason: SDUPTHER

## 2020-10-15 NOTE — PROGRESS NOTES
Subjective   Vesna Carl is a 54 y.o. female.     Hypertension  This is a chronic (Blood pressure readings have been much improved lately) problem. The current episode started more than 1 year ago. The problem is unchanged. The problem is uncontrolled. Associated symptoms include anxiety. Pertinent negatives include no chest pain, headaches, malaise/fatigue, palpitations, peripheral edema or shortness of breath. Risk factors for coronary artery disease include post-menopausal state and stress. Current antihypertension treatment includes ACE inhibitors. The current treatment provides significant improvement. There are no compliance problems.  There is no history of angina, kidney disease or CAD/MI.   Anxiety  Presents for follow-up (Stress level has been manageable) visit. Symptoms include irritability and nervous/anxious behavior. Patient reports no chest pain, confusion, decreased concentration, dizziness, excessive worry, muscle tension, nausea, palpitations, shortness of breath or suicidal ideas. Primary symptoms comment: Corona virus pandemic has increased stress at work. Symptoms occur occasionally. The severity of symptoms is moderate (Recently learned her father has terminal cancer). The quality of sleep is fair. Nighttime awakenings: occasional.            The following portions of the patient's history were reviewed and updated as appropriate: allergies, current medications, past social history and problem list.    Review of Systems   Constitutional: Positive for irritability. Negative for activity change, fatigue and malaise/fatigue.   Eyes: Negative for pain and visual disturbance.   Respiratory: Negative for chest tightness and shortness of breath.    Cardiovascular: Negative for chest pain, palpitations and leg swelling.   Gastrointestinal: Negative for diarrhea, nausea and vomiting.   Genitourinary: Negative for dysuria and hematuria.   Neurological: Negative for dizziness, syncope and headaches.  "  Psychiatric/Behavioral: Negative for confusion, decreased concentration and suicidal ideas. The patient is nervous/anxious.        Objective   /70   Pulse 81   Temp 98.4 °F (36.9 °C)   Ht 175.3 cm (69.02\")   Wt 65.2 kg (143 lb 12.8 oz)   SpO2 98%   Breastfeeding No   BMI 21.22 kg/m²   Physical Exam  Vitals signs and nursing note reviewed.   Constitutional:       Appearance: Normal appearance. She is well-developed.   HENT:      Head: Normocephalic.      Right Ear: External ear normal.      Left Ear: External ear normal.      Nose: Nose normal.   Eyes:      General: No scleral icterus.     Conjunctiva/sclera: Conjunctivae normal.      Pupils: Pupils are equal, round, and reactive to light.   Neck:      Musculoskeletal: Neck supple.      Thyroid: No thyromegaly.      Vascular: No carotid bruit.   Cardiovascular:      Rate and Rhythm: Normal rate and regular rhythm.   Pulmonary:      Effort: Pulmonary effort is normal.      Breath sounds: Normal breath sounds.   Musculoskeletal: Normal range of motion.   Skin:     General: Skin is warm and dry.      Findings: No rash.   Neurological:      General: No focal deficit present.      Mental Status: She is alert and oriented to person, place, and time.      Comments: No focal deficits no lateralizing signs   Psychiatric:         Mood and Affect: Mood normal.         Behavior: Behavior is cooperative.         Assessment/Plan   Problems Addressed this Visit        Active Problems    Essential hypertension - Primary    Relevant Medications    LORazepam (ATIVAN) 1 MG tablet    Anxiety    Relevant Medications    LORazepam (ATIVAN) 1 MG tablet      Diagnoses       Codes Comments    Essential hypertension    -  Primary ICD-10-CM: I10  ICD-9-CM: 401.9     Anxiety     ICD-10-CM: F41.9  ICD-9-CM: 300.00           New Medications Ordered This Visit   Medications   • LORazepam (ATIVAN) 1 MG tablet     Sig: Take 1 tablet by mouth 2 (Two) Times a Day As Needed for Anxiety. "     Dispense:  60 tablet     Refill:  3     Return to clinic fasting, schedule a colonoscopy, recommend a shingles vaccine.

## 2021-01-03 RX ORDER — TRIAMTERENE AND HYDROCHLOROTHIAZIDE 37.5; 25 MG/1; MG/1
TABLET ORAL
Qty: 45 TABLET | Refills: 1 | Status: SHIPPED | OUTPATIENT
Start: 2021-01-03 | End: 2021-06-28

## 2021-01-08 DIAGNOSIS — F41.9 ANXIETY: ICD-10-CM

## 2021-01-09 RX ORDER — VORTIOXETINE 10 MG/1
1 TABLET, FILM COATED ORAL DAILY
Qty: 90 TABLET | Refills: 0 | Status: SHIPPED | OUTPATIENT
Start: 2021-01-09 | End: 2021-04-06

## 2021-04-06 DIAGNOSIS — F41.9 ANXIETY: ICD-10-CM

## 2021-04-06 RX ORDER — VORTIOXETINE 10 MG/1
TABLET, FILM COATED ORAL
Qty: 90 TABLET | Refills: 0 | Status: SHIPPED | OUTPATIENT
Start: 2021-04-06 | End: 2021-06-28

## 2021-06-22 ENCOUNTER — E-VISIT (OUTPATIENT)
Dept: FAMILY MEDICINE CLINIC | Facility: TELEHEALTH | Age: 56
End: 2021-06-22

## 2021-06-22 DIAGNOSIS — J01.40 ACUTE PANSINUSITIS, RECURRENCE NOT SPECIFIED: ICD-10-CM

## 2021-06-22 DIAGNOSIS — R05.9 COUGH: Primary | ICD-10-CM

## 2021-06-22 PROCEDURE — 99422 OL DIG E/M SVC 11-20 MIN: CPT | Performed by: NURSE PRACTITIONER

## 2021-06-22 RX ORDER — AMOXICILLIN AND CLAVULANATE POTASSIUM 875; 125 MG/1; MG/1
1 TABLET, FILM COATED ORAL 2 TIMES DAILY
Qty: 20 TABLET | Refills: 0 | Status: SHIPPED | OUTPATIENT
Start: 2021-06-22 | End: 2021-07-02

## 2021-06-22 RX ORDER — METHYLPREDNISOLONE 4 MG/1
TABLET ORAL
Qty: 21 TABLET | Refills: 0 | Status: SHIPPED | OUTPATIENT
Start: 2021-06-22 | End: 2022-01-06

## 2021-06-22 RX ORDER — BENZONATATE 200 MG/1
200 CAPSULE ORAL 3 TIMES DAILY PRN
Qty: 21 CAPSULE | Refills: 0 | Status: SHIPPED | OUTPATIENT
Start: 2021-06-22 | End: 2022-01-06

## 2021-06-22 NOTE — PATIENT INSTRUCTIONS
Cough, Adult  Coughing is a reflex that clears your throat and your airways (respiratory system). Coughing helps to heal and protect your lungs. It is normal to cough occasionally, but a cough that happens with other symptoms or lasts a long time may be a sign of a condition that needs treatment. An acute cough may only last 2-3 weeks, while a chronic cough may last 8 or more weeks.  Coughing is commonly caused by:  · Infection of the respiratory systemby viruses or bacteria.  · Breathing in substances that irritate your lungs.  · Allergies.  · Asthma.  · Mucus that runs down the back of your throat (postnasal drip).  · Smoking.  · Acid backing up from the stomach into the esophagus (gastroesophageal reflux).  · Certain medicines.  · Chronic lung problems.  · Other medical conditions such as heart failure or a blood clot in the lung (pulmonary embolism).  Follow these instructions at home:  Medicines  · Take over-the-counter and prescription medicines only as told by your health care provider.  · Talk with your health care provider before you take a cough suppressant medicine.  Lifestyle    · Avoid cigarette smoke. Do not use any products that contain nicotine or tobacco, such as cigarettes, e-cigarettes, and chewing tobacco. If you need help quitting, ask your health care provider.  · Drink enough fluid to keep your urine pale yellow.  · Avoid caffeine.  · Do not drink alcohol if your health care provider tells you not to drink.  General instructions    · Pay close attention to changes in your cough. Tell your health care provider about them.  · Always cover your mouth when you cough.  · Avoid things that make you cough, such as perfume, candles, cleaning products, or campfire or tobacco smoke.  · If the air is dry, use a cool mist vaporizer or humidifier in your bedroom or your home to help loosen secretions.  · If your cough is worse at night, try to sleep in a semi-upright position.  · Rest as needed.  · Keep  all follow-up visits as told by your health care provider. This is important.  Contact a health care provider if you:  · Have new symptoms.  · Cough up pus.  · Have a cough that does not get better after 2-3 weeks or gets worse.  · Cannot control your cough with cough suppressant medicines and you are losing sleep.  · Have pain that gets worse or pain that is not helped with medicine.  · Have a fever.  · Have unexplained weight loss.  · Have night sweats.  Get help right away if:  · You cough up blood.  · You have difficulty breathing.  · Your heartbeat is very fast.  These symptoms may represent a serious problem that is an emergency. Do not wait to see if the symptoms will go away. Get medical help right away. Call your local emergency services (911 in the [place] Do not drive yourself to the hospital.  Summary  · Coughing is a reflex that clears your throat and your airways. It is normal to cough occasionally, but a cough that happens with other symptoms or lasts a long time may be a sign of a condition that needs treatment.  · Take over-the-counter and prescription medicines only as told by your health care provider.  · Always cover your mouth when you cough.  · Contact a health care provider if you have new symptoms or a cough that does not get better after 2-3 weeks or gets worse.  This information is not intended to replace advice given to you by your health care provider. Make sure you discuss any questions you have with your health care provider.  Document Revised: 01/06/2020 Document Reviewed: 01/06/2020  Elsevier Patient Education © 2021 [employer]    Sinusitis, Adult  Sinusitis is inflammation of your sinuses. Sinuses are hollow spaces in the bones around your face. Your sinuses are located:  · Around your eyes.  · In the middle of your forehead.  · Behind your nose.  · In your cheekbones.  Mucus normally drains out of your sinuses. When your nasal tissues become inflamed or swollen, mucus can become  trapped or blocked. This allows bacteria, viruses, and fungi to grow, which leads to infection. Most infections of the sinuses are caused by a virus.  Sinusitis can develop quickly. It can last for up to 4 weeks (acute) or for more than 12 weeks (chronic). Sinusitis often develops after a cold.  What are the causes?  This condition is caused by anything that creates swelling in the sinuses or stops mucus from draining. This includes:  · Allergies.  · Asthma.  · Infection from bacteria or viruses.  · Deformities or blockages in your nose or sinuses.  · Abnormal growths in the nose (nasal polyps).  · Pollutants, such as chemicals or irritants in the air.  · Infection from fungi (rare).  What increases the risk?  You are more likely to develop this condition if you:  · Have a weak body defense system (immune system).  · Do a lot of swimming or diving.  · Overuse nasal sprays.  · Smoke.  What are the signs or symptoms?  The main symptoms of this condition are pain and a feeling of pressure around the affected sinuses. Other symptoms include:  · Stuffy nose or congestion.  · Thick drainage from your nose.  · Swelling and warmth over the affected sinuses.  · Headache.  · Upper toothache.  · A cough that may get worse at night.  · Extra mucus that collects in the throat or the back of the nose (postnasal drip).  · Decreased sense of smell and taste.  · Fatigue.  · A fever.  · Sore throat.  · Bad breath.  How is this diagnosed?  This condition is diagnosed based on:  · Your symptoms.  · Your medical history.  · A physical exam.  · Tests to find out if your condition is acute or chronic. This may include:  ? Checking your nose for nasal polyps.  ? Viewing your sinuses using a device that has a light (endoscope).  ? Testing for allergies or bacteria.  ? Imaging tests, such as an MRI or CT scan.  In rare cases, a bone biopsy may be done to rule out more serious types of fungal sinus disease.  How is this treated?  Treatment  for sinusitis depends on the cause and whether your condition is chronic or acute.  · If caused by a virus, your symptoms should go away on their own within 10 days. You may be given medicines to relieve symptoms. They include:  ? Medicines that shrink swollen nasal passages (topical intranasal decongestants).  ? Medicines that treat allergies (antihistamines).  ? A spray that eases inflammation of the nostrils (topical intranasal corticosteroids).  ? Rinses that help get rid of thick mucus in your nose (nasal saline washes).  · If caused by bacteria, your health care provider may recommend waiting to see if your symptoms improve. Most bacterial infections will get better without antibiotic medicine. You may be given antibiotics if you have:  ? A severe infection.  ? A weak immune system.  · If caused by narrow nasal passages or nasal polyps, you may need to have surgery.  Follow these instructions at home:  Medicines  · Take, use, or apply over-the-counter and prescription medicines only as told by your health care provider. These may include nasal sprays.  · If you were prescribed an antibiotic medicine, take it as told by your health care provider. Do not stop taking the antibiotic even if you start to feel better.  Hydrate and humidify    · Drink enough fluid to keep your urine pale yellow. Staying hydrated will help to thin your mucus.  · Use a cool mist humidifier to keep the humidity level in your home above 50%.  · Inhale steam for 10-15 minutes, 3-4 times a day, or as told by your health care provider. You can do this in the bathroom while a hot shower is running.  · Limit your exposure to cool or dry air.  Rest  · Rest as much as possible.  · Sleep with your head raised (elevated).  · Make sure you get enough sleep each night.  General instructions    · Apply a warm, moist washcloth to your face 3-4 times a day or as told by your health care provider. This will help with discomfort.  · Wash your hands  often with soap and water to reduce your exposure to germs. If soap and water are not available, use hand .  · Do not smoke. Avoid being around people who are smoking (secondhand smoke).  · Keep all follow-up visits as told by your health care provider. This is important.  Contact a health care provider if:  · You have a fever.  · Your symptoms get worse.  · Your symptoms do not improve within 10 days.  Get help right away if:  · You have a severe headache.  · You have persistent vomiting.  · You have severe pain or swelling around your face or eyes.  · You have vision problems.  · You develop confusion.  · Your neck is stiff.  · You have trouble breathing.  Summary  · Sinusitis is soreness and inflammation of your sinuses. Sinuses are hollow spaces in the bones around your face.  · This condition is caused by nasal tissues that become inflamed or swollen. The swelling traps or blocks the flow of mucus. This allows bacteria, viruses, and fungi to grow, which leads to infection.  · If you were prescribed an antibiotic medicine, take it as told by your health care provider. Do not stop taking the antibiotic even if you start to feel better.  · Keep all follow-up visits as told by your health care provider. This is important.  This information is not intended to replace advice given to you by your health care provider. Make sure you discuss any questions you have with your health care provider.  Document Revised: 05/20/2019 Document Reviewed: 05/20/2019  [employer] Patient Education © 2021 [employer]vier Inc.

## 2021-06-22 NOTE — PROGRESS NOTES
Vesna Carl    1965  [id number]    I have reviewed the e-Visit questionnaire and patient's answers, my assessment and plan are as follows:      HPI  Vesna Carl is a 55 y.o. with a 1-4 day history of ear pain, non-productive cough, nasal congestion with greenish/brown discharge, body aches.  Low-grade temp <100.4.  She denies sore throat, wheezing, shortness of breath, loss of taste or smell.  She reports that she has had similar symptoms in the past, which were treated as sinus infection and bronchitis.    Review of Systems - Negative except symptoms listed in HPI      Diagnoses and all orders for this visit:    1. Cough (Primary)  -     methylPREDNISolone (MEDROL) 4 MG dose pack; Take as directed on package instructions.  Dispense: 21 tablet; Refill: 0  -     benzonatate (TESSALON) 200 MG capsule; Take 1 capsule by mouth 3 (Three) Times a Day As Needed for Cough.  Dispense: 21 capsule; Refill: 0    2. Acute pansinusitis, recurrence not specified  -     amoxicillin-clavulanate (AUGMENTIN) 875-125 MG per tablet; Take 1 tablet by mouth 2 (Two) Times a Day for 10 days.  Dispense: 20 tablet; Refill: 0  -     methylPREDNISolone (MEDROL) 4 MG dose pack; Take as directed on package instructions.  Dispense: 21 tablet; Refill: 0    --Augmentin--antibiotic for bacterial infection; take as prescribed  --Medrol--oral steroid that decreases inflammation in sinuses and lungs; take as prescribed  --Benzonatate--cough capsule that with help with cough; take as prescribed  --ADVISE--increase fluid intake, tylenol or ibuprofen for fever and/or pain, warm compresses to areas of sinus pain/pressure, humidifier  --if no improvement in 5-7 days, will need follow-up for further evaluation    Any medications prescribed have been sent electronically to   [id number] - [address] AT SEC OF [facility] - [phone]  - [phone] [address] [address]  Phone:  [phone] Fax: [phone]      Time Documentation  Counseled patient  Counseling topics: diagnosis, treatment options and return instructions  Total encounter time: counseling time more than 50% of visit: 20 minutes        BEST Perrin  06/22/21  10:11 EDT

## 2021-06-27 DIAGNOSIS — F41.9 ANXIETY: ICD-10-CM

## 2021-06-28 DIAGNOSIS — I10 ESSENTIAL HYPERTENSION: ICD-10-CM

## 2021-06-28 DIAGNOSIS — F41.9 ANXIETY: ICD-10-CM

## 2021-06-28 RX ORDER — VORTIOXETINE 10 MG/1
TABLET, FILM COATED ORAL
Qty: 90 TABLET | Refills: 0 | Status: SHIPPED | OUTPATIENT
Start: 2021-06-28 | End: 2021-10-05

## 2021-06-28 RX ORDER — TRIAMTERENE AND HYDROCHLOROTHIAZIDE 37.5; 25 MG/1; MG/1
TABLET ORAL
Qty: 45 TABLET | Refills: 0 | Status: SHIPPED | OUTPATIENT
Start: 2021-06-28 | End: 2021-10-05

## 2021-06-29 RX ORDER — LORAZEPAM 1 MG/1
1 TABLET ORAL 2 TIMES DAILY PRN
Qty: 60 TABLET | Refills: 0 | Status: SHIPPED | OUTPATIENT
Start: 2021-06-29 | End: 2022-01-06 | Stop reason: SDUPTHER

## 2021-08-10 RX ORDER — LISINOPRIL 40 MG/1
40 TABLET ORAL DAILY
Qty: 90 TABLET | Refills: 1 | Status: SHIPPED | OUTPATIENT
Start: 2021-08-10 | End: 2022-03-21

## 2021-10-04 DIAGNOSIS — F41.9 ANXIETY: ICD-10-CM

## 2021-10-05 RX ORDER — VORTIOXETINE 10 MG/1
TABLET, FILM COATED ORAL
Qty: 30 TABLET | Refills: 0 | Status: SHIPPED | OUTPATIENT
Start: 2021-10-05 | End: 2021-10-08 | Stop reason: SDUPTHER

## 2021-10-05 RX ORDER — TRIAMTERENE AND HYDROCHLOROTHIAZIDE 37.5; 25 MG/1; MG/1
TABLET ORAL
Qty: 45 TABLET | Refills: 0 | Status: SHIPPED | OUTPATIENT
Start: 2021-10-05 | End: 2022-01-06 | Stop reason: SDUPTHER

## 2021-10-07 DIAGNOSIS — F41.9 ANXIETY: ICD-10-CM

## 2021-10-08 RX ORDER — VORTIOXETINE 10 MG/1
TABLET, FILM COATED ORAL
Qty: 90 TABLET | Refills: 0 | Status: SHIPPED | OUTPATIENT
Start: 2021-10-08 | End: 2022-01-06 | Stop reason: SDUPTHER

## 2022-01-06 ENCOUNTER — LAB (OUTPATIENT)
Dept: LAB | Facility: HOSPITAL | Age: 57
End: 2022-01-06

## 2022-01-06 ENCOUNTER — OFFICE VISIT (OUTPATIENT)
Dept: FAMILY MEDICINE CLINIC | Facility: CLINIC | Age: 57
End: 2022-01-06

## 2022-01-06 VITALS
DIASTOLIC BLOOD PRESSURE: 78 MMHG | BODY MASS INDEX: 22.16 KG/M2 | OXYGEN SATURATION: 98 % | WEIGHT: 149.6 LBS | SYSTOLIC BLOOD PRESSURE: 102 MMHG | HEIGHT: 69 IN | TEMPERATURE: 98.7 F | RESPIRATION RATE: 16 BRPM | HEART RATE: 76 BPM

## 2022-01-06 DIAGNOSIS — I10 ESSENTIAL HYPERTENSION: Primary | ICD-10-CM

## 2022-01-06 DIAGNOSIS — Z13.220 LIPID SCREENING: ICD-10-CM

## 2022-01-06 DIAGNOSIS — F41.9 ANXIETY: ICD-10-CM

## 2022-01-06 DIAGNOSIS — E55.9 VITAMIN D DEFICIENCY: ICD-10-CM

## 2022-01-06 LAB
ALBUMIN SERPL-MCNC: 4.7 G/DL (ref 3.5–5.2)
ALBUMIN/GLOB SERPL: 1.6 G/DL
ALP SERPL-CCNC: 47 U/L (ref 39–117)
ALT SERPL W P-5'-P-CCNC: 12 U/L (ref 1–33)
ANION GAP SERPL CALCULATED.3IONS-SCNC: 10 MMOL/L (ref 5–15)
AST SERPL-CCNC: 20 U/L (ref 1–32)
BILIRUB SERPL-MCNC: 0.2 MG/DL (ref 0–1.2)
BUN SERPL-MCNC: 19 MG/DL (ref 6–20)
BUN/CREAT SERPL: 20 (ref 7–25)
CALCIUM SPEC-SCNC: 10.2 MG/DL (ref 8.6–10.5)
CHLORIDE SERPL-SCNC: 96 MMOL/L (ref 98–107)
CHOLEST SERPL-MCNC: 224 MG/DL (ref 0–200)
CO2 SERPL-SCNC: 27 MMOL/L (ref 22–29)
CREAT SERPL-MCNC: 0.95 MG/DL (ref 0.57–1)
DEPRECATED RDW RBC AUTO: 44.2 FL (ref 37–54)
ERYTHROCYTE [DISTWIDTH] IN BLOOD BY AUTOMATED COUNT: 12.7 % (ref 12.3–15.4)
GFR SERPL CREATININE-BSD FRML MDRD: 61 ML/MIN/1.73
GLOBULIN UR ELPH-MCNC: 3 GM/DL
GLUCOSE SERPL-MCNC: 93 MG/DL (ref 65–99)
HCT VFR BLD AUTO: 42.2 % (ref 34–46.6)
HDLC SERPL-MCNC: 72 MG/DL (ref 40–60)
HGB BLD-MCNC: 13.6 G/DL (ref 12–15.9)
LDLC SERPL CALC-MCNC: 130 MG/DL (ref 0–100)
LDLC/HDLC SERPL: 1.76 {RATIO}
MCH RBC QN AUTO: 30.4 PG (ref 26.6–33)
MCHC RBC AUTO-ENTMCNC: 32.2 G/DL (ref 31.5–35.7)
MCV RBC AUTO: 94.2 FL (ref 79–97)
PLATELET # BLD AUTO: 412 10*3/MM3 (ref 140–450)
PMV BLD AUTO: 10.1 FL (ref 6–12)
POTASSIUM SERPL-SCNC: 4.6 MMOL/L (ref 3.5–5.2)
PROT SERPL-MCNC: 7.7 G/DL (ref 6–8.5)
RBC # BLD AUTO: 4.48 10*6/MM3 (ref 3.77–5.28)
SODIUM SERPL-SCNC: 133 MMOL/L (ref 136–145)
TRIGL SERPL-MCNC: 125 MG/DL (ref 0–150)
TSH SERPL DL<=0.05 MIU/L-ACNC: 3.13 UIU/ML (ref 0.27–4.2)
VLDLC SERPL-MCNC: 22 MG/DL (ref 5–40)
WBC NRBC COR # BLD: 7.73 10*3/MM3 (ref 3.4–10.8)

## 2022-01-06 PROCEDURE — 80061 LIPID PANEL: CPT

## 2022-01-06 PROCEDURE — 82306 VITAMIN D 25 HYDROXY: CPT | Performed by: FAMILY MEDICINE

## 2022-01-06 PROCEDURE — 99214 OFFICE O/P EST MOD 30 MIN: CPT | Performed by: FAMILY MEDICINE

## 2022-01-06 PROCEDURE — 85027 COMPLETE CBC AUTOMATED: CPT

## 2022-01-06 PROCEDURE — 84443 ASSAY THYROID STIM HORMONE: CPT

## 2022-01-06 PROCEDURE — 80053 COMPREHEN METABOLIC PANEL: CPT

## 2022-01-06 PROCEDURE — 36415 COLL VENOUS BLD VENIPUNCTURE: CPT

## 2022-01-06 RX ORDER — TRIAMTERENE AND HYDROCHLOROTHIAZIDE 37.5; 25 MG/1; MG/1
0.5 TABLET ORAL DAILY
Qty: 45 TABLET | Refills: 3 | Status: SHIPPED | OUTPATIENT
Start: 2022-01-06 | End: 2022-10-13

## 2022-01-06 RX ORDER — VORTIOXETINE 10 MG/1
1 TABLET, FILM COATED ORAL DAILY
Qty: 90 TABLET | Refills: 1 | Status: SHIPPED | OUTPATIENT
Start: 2022-01-06 | End: 2022-04-18

## 2022-01-06 RX ORDER — LORAZEPAM 1 MG/1
1 TABLET ORAL 2 TIMES DAILY PRN
Qty: 60 TABLET | Refills: 2 | Status: SHIPPED | OUTPATIENT
Start: 2022-01-06 | End: 2022-09-07 | Stop reason: SDUPTHER

## 2022-01-07 LAB — 25(OH)D3 SERPL-MCNC: 84.4 NG/ML

## 2022-01-07 NOTE — PROGRESS NOTES
"Chief Complaint  Anxiety    Subjective          Vesna Carl presents to [facility]  Anxiety  Presents for follow-up (Stress level has been manageable) visit. Symptoms include irritability and nervous/anxious behavior. Patient reports no chest pain, confusion, decreased concentration, dizziness, excessive worry, muscle tension, nausea, palpitations, shortness of breath or suicidal ideas. Primary symptoms comment: Corona virus pandemic has increased stress at work. Symptoms occur occasionally. The severity of symptoms is moderate (Recently learned her father has terminal cancer). The quality of sleep is fair. Nighttime awakenings: occasional.       Hypertension  This is a chronic (Blood pressure readings have been much improved lately) problem. The current episode started more than 1 year ago. The problem is unchanged. The problem is uncontrolled. Associated symptoms include anxiety. Pertinent negatives include no chest pain, headaches, malaise/fatigue, palpitations, peripheral edema or shortness of breath. Risk factors for coronary artery disease include post-menopausal state and stress. Current antihypertension treatment includes ACE inhibitors. The current treatment provides significant improvement. There are no compliance problems.  There is no history of angina, kidney disease or CAD/MI.       Objective   Vital Signs:   /78   Pulse 76   Temp 98.7 °F (37.1 °C)   Resp 16   Ht 175.3 cm (69.02\")   Wt 67.9 kg (149 lb 9.6 oz)   SpO2 98%   BMI 22.08 kg/m²     Physical Exam  Vitals and nursing note reviewed.   Constitutional:       Appearance: Normal appearance. She is well-developed.   HENT:      Head: Normocephalic and atraumatic.   Eyes:      General: No scleral icterus.     Conjunctiva/sclera: Conjunctivae normal.      Pupils: Pupils are equal, round, and reactive to light.   Neck:      Thyroid: No thyromegaly.   Cardiovascular:      Rate and Rhythm: Normal rate and " regular rhythm.   Pulmonary:      Effort: Pulmonary effort is normal.      Breath sounds: Normal breath sounds.   Musculoskeletal:         General: Normal range of motion.      Cervical back: Neck supple.   Skin:     General: Skin is warm and dry.      Findings: No rash.   Neurological:      General: No focal deficit present.      Mental Status: She is alert and oriented to person, place, and time.      Comments: No focal deficits no lateralizing signs   Psychiatric:         Mood and Affect: Mood normal.         Behavior: Behavior is cooperative.        Result Review :                 Assessment and Plan    Diagnoses and all orders for this visit:    1. Essential hypertension (Primary)  -     Comprehensive Metabolic Panel; Future  -     Lipid Panel; Future  -     TSH; Future  -     Vitamin D 25 Hydroxy; Future  -     CBC (No Diff); Future  -     LORazepam (ATIVAN) 1 MG tablet; Take 1 tablet by mouth 2 (Two) Times a Day As Needed for Anxiety.  Dispense: 60 tablet; Refill: 2  -     Cancel: Comprehensive Metabolic Panel  -     Cancel: Lipid Panel  -     Cancel: TSH  -     Vitamin D 25 Hydroxy  -     Cancel: CBC (No Diff)  -     triamterene-hydrochlorothiazide (MAXZIDE-25) 37.5-25 MG per tablet; Take 0.5 tablets by mouth Daily.  Dispense: 45 tablet; Refill: 3    2. Anxiety  -     LORazepam (ATIVAN) 1 MG tablet; Take 1 tablet by mouth 2 (Two) Times a Day As Needed for Anxiety.  Dispense: 60 tablet; Refill: 2  -     Vortioxetine HBr (Trintellix) 10 MG tablet; Take 10 mg by mouth Daily for 90 days.  Dispense: 90 tablet; Refill: 1    3. Vitamin D deficiency  -     Vitamin D 25 Hydroxy; Future  -     Vitamin D 25 Hydroxy    4. Lipid screening  -     Lipid Panel; Future  -     Cancel: Lipid Panel        Follow Up   Return in about 6 months (around 7/6/2022) for Recheck.  Patient was given instructions and counseling regarding her condition or for health maintenance advice. Please see specific information pulled into the AVS  if appropriate.

## 2022-03-21 RX ORDER — LISINOPRIL 40 MG/1
40 TABLET ORAL DAILY
Qty: 90 TABLET | Refills: 1 | Status: SHIPPED | OUTPATIENT
Start: 2022-03-21 | End: 2022-09-07 | Stop reason: SDUPTHER

## 2022-04-18 DIAGNOSIS — F41.9 ANXIETY: ICD-10-CM

## 2022-04-18 RX ORDER — VORTIOXETINE 10 MG/1
TABLET, FILM COATED ORAL
Qty: 90 TABLET | Refills: 1 | Status: SHIPPED | OUTPATIENT
Start: 2022-04-18 | End: 2022-09-07 | Stop reason: SDUPTHER

## 2022-09-07 ENCOUNTER — OFFICE VISIT (OUTPATIENT)
Dept: FAMILY MEDICINE CLINIC | Facility: CLINIC | Age: 57
End: 2022-09-07

## 2022-09-07 VITALS
HEART RATE: 60 BPM | TEMPERATURE: 97.3 F | OXYGEN SATURATION: 100 % | BODY MASS INDEX: 22.66 KG/M2 | HEIGHT: 69 IN | SYSTOLIC BLOOD PRESSURE: 122 MMHG | WEIGHT: 153 LBS | DIASTOLIC BLOOD PRESSURE: 86 MMHG

## 2022-09-07 DIAGNOSIS — F41.9 ANXIETY: ICD-10-CM

## 2022-09-07 DIAGNOSIS — I10 ESSENTIAL HYPERTENSION: Primary | ICD-10-CM

## 2022-09-07 PROCEDURE — 99213 OFFICE O/P EST LOW 20 MIN: CPT | Performed by: FAMILY MEDICINE

## 2022-09-07 RX ORDER — LORAZEPAM 1 MG/1
1 TABLET ORAL 2 TIMES DAILY PRN
Qty: 60 TABLET | Refills: 2 | Status: SHIPPED | OUTPATIENT
Start: 2022-09-07

## 2022-09-07 RX ORDER — LISINOPRIL 40 MG/1
40 TABLET ORAL DAILY
Qty: 90 TABLET | Refills: 1 | Status: SHIPPED | OUTPATIENT
Start: 2022-09-07 | End: 2023-03-05

## 2022-09-08 NOTE — PROGRESS NOTES
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 Symptoms include irritability and nervous/anxious behavior. Patient reports no chest pain, confusion, decreased concentration, dizziness, excessive worry, muscle tension, nausea, palpitations, shortness of breath or suicidal ideas. Primary symptoms comment: Corona virus pandemic has increased stress at work. Symptoms occur occasionally. The severity of symptoms is moderate (Recently learned her father has terminal cancer). The quality of sleep is fair. Nighttime awakenings: occasional.       Hypertension  This is a chronic (Blood pressure readings have been much improved lately) problem. The current episode started more than 1 year ago. The problem is unchanged. The problem is uncontrolled. Associated symptoms include anxiety. Pertinent negatives include no chest pain, headaches, malaise/fatigue, palpitations, peripheral edema or shortness of breath. Risk factors for coronary artery disease include post-menopausal state and stress. Current antihypertension treatment includes ACE inhibitors. The current treatment provides significant improvement. There are no compliance problems.  There is no history of angina, kidney disease or CAD/MI.       Objective   Vital Signs:  /86   Pulse 60   Temp 97.3 °F (36.3 °C)   Ht 175.3 cm (69.02\")   Wt 69.4 kg (153 lb)   SpO2 100%   BMI 22.58 kg/m²   Estimated body mass index is 22.58 kg/m² as calculated from the following:    Height as of this encounter: 175.3 cm (69.02\").    Weight as of this encounter: 69.4 kg (153 lb).    BMI is within normal parameters. No other follow-up for BMI required.      Physical Exam  Vitals and nursing note reviewed.   Constitutional:       Appearance: Normal appearance. She is well-developed.   HENT:      Head: " Normocephalic and atraumatic.      Right Ear: External ear normal.      Left Ear: External ear normal.      Nose: Nose normal.   Eyes:      General: No scleral icterus.     Conjunctiva/sclera: Conjunctivae normal.      Pupils: Pupils are equal, round, and reactive to light.   Neck:      Thyroid: No thyromegaly.      Vascular: No carotid bruit.   Cardiovascular:      Rate and Rhythm: Normal rate and regular rhythm.   Pulmonary:      Effort: Pulmonary effort is normal.      Breath sounds: Normal breath sounds.   Musculoskeletal:         General: Normal range of motion.      Cervical back: Neck supple.   Skin:     General: Skin is warm and dry.      Findings: No rash.   Neurological:      General: No focal deficit present.      Mental Status: She is alert and oriented to person, place, and time.      Comments: No focal deficits no lateralizing signs   Psychiatric:         Mood and Affect: Mood normal.         Behavior: Behavior is cooperative.        Result Review :                Assessment and Plan   Diagnoses and all orders for this visit:    1. Essential hypertension (Primary)  -     lisinopril (PRINIVIL,ZESTRIL) 40 MG tablet; Take 1 tablet by mouth Daily.  Dispense: 90 tablet; Refill: 1    2. Anxiety  -     Vortioxetine HBr (Trintellix) 10 MG tablet tablet; Take 10 mg by mouth Daily.  Dispense: 90 tablet; Refill: 1  -     LORazepam (ATIVAN) 1 MG tablet; Take 1 tablet by mouth 2 (Two) Times a Day As Needed for Anxiety.  Dispense: 60 tablet; Refill: 2             Follow Up   Return in about 5 months (around 2/7/2023) for fasting appt.  Patient was given instructions and counseling regarding her condition or for health maintenance advice. Please see specific information pulled into the AVS if appropriate.

## 2022-09-11 DIAGNOSIS — F41.9 ANXIETY: ICD-10-CM

## 2022-09-12 RX ORDER — VORTIOXETINE 10 MG/1
TABLET, FILM COATED ORAL
Qty: 90 TABLET | Refills: 1 | Status: SHIPPED | OUTPATIENT
Start: 2022-09-12 | End: 2023-03-10

## 2022-10-13 DIAGNOSIS — I10 ESSENTIAL HYPERTENSION: ICD-10-CM

## 2022-10-13 RX ORDER — TRIAMTERENE AND HYDROCHLOROTHIAZIDE 37.5; 25 MG/1; MG/1
TABLET ORAL
Qty: 45 TABLET | Refills: 1 | Status: SHIPPED | OUTPATIENT
Start: 2022-10-13 | End: 2023-03-10

## 2023-03-03 DIAGNOSIS — I10 ESSENTIAL HYPERTENSION: ICD-10-CM

## 2023-03-03 NOTE — TELEPHONE ENCOUNTER
Rx Refill Note  Requested Prescriptions     Pending Prescriptions Disp Refills   • lisinopril (PRINIVIL,ZESTRIL) 40 MG tablet [Pharmacy Med Name: LISINOPRIL 40MG TABLETS] 90 tablet 1     Sig: TAKE 1 TABLET BY MOUTH DAILY      Last office visit with prescribing clinician: 9/7/2022   Last telemedicine visit with prescribing clinician: Visit date not found   Next office visit with prescribing clinician: Visit date not found                         Would you like a call back once the refill request has been completed: [] Yes [] No    If the office needs to give you a call back, can they leave a voicemail: [] Yes [] No    Lidya Clarke MA  03/03/23, 13:41 EST

## 2023-03-03 NOTE — TELEPHONE ENCOUNTER
Rx Refill Note  Requested Prescriptions     Pending Prescriptions Disp Refills   • lisinopril (PRINIVIL,ZESTRIL) 40 MG tablet [Pharmacy Med Name: LISINOPRIL 40MG TABLETS] 90 tablet 1     Sig: TAKE 1 TABLET BY MOUTH DAILY      Last office visit with prescribing clinician: 9/7/2022   Last telemedicine visit with prescribing clinician: Visit date not found   Next office visit with prescribing clinician: Visit date not found                         Would you like a call back once the refill request has been completed: [] Yes [] No    If the office needs to give you a call back, can they leave a voicemail: [] Yes [] No    Patrizia Mliler MA  03/03/23, 13:42 EST

## 2023-03-05 RX ORDER — LISINOPRIL 40 MG/1
40 TABLET ORAL DAILY
Qty: 90 TABLET | Refills: 0 | Status: SHIPPED | OUTPATIENT
Start: 2023-03-05

## 2023-03-09 DIAGNOSIS — F41.9 ANXIETY: ICD-10-CM

## 2023-03-10 DIAGNOSIS — I10 ESSENTIAL HYPERTENSION: ICD-10-CM

## 2023-03-10 RX ORDER — TRIAMTERENE AND HYDROCHLOROTHIAZIDE 37.5; 25 MG/1; MG/1
TABLET ORAL
Qty: 45 TABLET | Refills: 0 | Status: SHIPPED | OUTPATIENT
Start: 2023-03-10

## 2023-03-10 RX ORDER — VORTIOXETINE 10 MG/1
TABLET, FILM COATED ORAL
Qty: 90 TABLET | Refills: 1 | Status: SHIPPED | OUTPATIENT
Start: 2023-03-10

## 2023-03-10 NOTE — TELEPHONE ENCOUNTER
Rx Refill Note  Requested Prescriptions     Pending Prescriptions Disp Refills   • triamterene-hydrochlorothiazide (MAXZIDE-25) 37.5-25 MG per tablet [Pharmacy Med Name: TRIAMTERENE 37.5MG/ HCTZ 25MG TABS] 45 tablet 1     Sig: TAKE 1/2 TABLET BY MOUTH DAILY      Last office visit with prescribing clinician: 9/7/2022   Last telemedicine visit with prescribing clinician: Visit date not found   Next office visit with prescribing clinician: Visit date not found                         Would you like a call back once the refill request has been completed: [] Yes [] No    If the office needs to give you a call back, can they leave a voicemail: [] Yes [] No    Lois Jasso MA  03/10/23, 08:07 EST

## 2023-06-01 DIAGNOSIS — I10 ESSENTIAL HYPERTENSION: ICD-10-CM

## 2023-06-01 RX ORDER — LISINOPRIL 40 MG/1
40 TABLET ORAL DAILY
Qty: 90 TABLET | Refills: 0 | Status: SHIPPED | OUTPATIENT
Start: 2023-06-01

## 2023-06-01 NOTE — TELEPHONE ENCOUNTER
Rx Refill Note  Requested Prescriptions     Pending Prescriptions Disp Refills   • lisinopril (PRINIVIL,ZESTRIL) 40 MG tablet 90 tablet 0     Sig: Take 1 tablet by mouth Daily.      Last office visit with prescribing clinician: 9/7/2022   Last telemedicine visit with prescribing clinician: Visit date not found   Next office visit with prescribing clinician: Visit date not found                         Would you like a call back once the refill request has been completed: [] Yes [] No    If the office needs to give you a call back, can they leave a voicemail: [] Yes [] No    Lidya Clarke MA  06/01/23, 09:46 EDT

## 2023-06-08 DIAGNOSIS — I10 ESSENTIAL HYPERTENSION: ICD-10-CM

## 2023-06-08 RX ORDER — TRIAMTERENE AND HYDROCHLOROTHIAZIDE 37.5; 25 MG/1; MG/1
TABLET ORAL
Qty: 45 TABLET | Refills: 0 | Status: SHIPPED | OUTPATIENT
Start: 2023-06-08

## 2023-06-08 NOTE — TELEPHONE ENCOUNTER
Rx Refill Note  Requested Prescriptions     Pending Prescriptions Disp Refills    triamterene-hydrochlorothiazide (MAXZIDE-25) 37.5-25 MG per tablet [Pharmacy Med Name: TRIAMTERENE 37.5MG/ HCTZ 25MG TABS] 45 tablet 0     Sig: TAKE 1/2 TABLET BY MOUTH DAILY      Last office visit with prescribing clinician: 9/7/2022   Last telemedicine visit with prescribing clinician: Visit date not found   Next office visit with prescribing clinician: Visit date not found                         Would you like a call back once the refill request has been completed: [] Yes [] No    If the office needs to give you a call back, can they leave a voicemail: [] Yes [] No    Patrizia Miller MA  06/08/23, 10:34 EDT

## 2023-06-09 DIAGNOSIS — F41.9 ANXIETY: ICD-10-CM

## 2023-06-11 RX ORDER — LORAZEPAM 1 MG/1
TABLET ORAL
Qty: 60 TABLET | Refills: 0 | Status: SHIPPED | OUTPATIENT
Start: 2023-06-11

## 2023-08-30 DIAGNOSIS — F41.9 ANXIETY: ICD-10-CM

## 2023-08-30 DIAGNOSIS — I10 ESSENTIAL HYPERTENSION: ICD-10-CM

## 2023-08-30 RX ORDER — LORAZEPAM 1 MG/1
1 TABLET ORAL 2 TIMES DAILY PRN
Qty: 60 TABLET | Refills: 0 | Status: CANCELLED | OUTPATIENT
Start: 2023-08-30

## 2023-08-30 RX ORDER — TRIAMTERENE AND HYDROCHLOROTHIAZIDE 37.5; 25 MG/1; MG/1
0.5 TABLET ORAL DAILY
Qty: 45 TABLET | Refills: 0 | Status: CANCELLED | OUTPATIENT
Start: 2023-08-30

## 2023-08-30 RX ORDER — LISINOPRIL 40 MG/1
40 TABLET ORAL DAILY
Qty: 90 TABLET | Refills: 0 | Status: CANCELLED | OUTPATIENT
Start: 2023-08-30

## 2023-08-30 NOTE — TELEPHONE ENCOUNTER
Caller: Vesna Carl    Relationship: Self    Best call back number: [phone]     Requested Prescriptions:   Requested Prescriptions     Pending Prescriptions Disp Refills    lisinopril (PRINIVIL,ZESTRIL) 40 MG tablet 90 tablet 0     Sig: Take 1 tablet by mouth Daily.    triamterene-hydrochlorothiazide (MAXZIDE-25) 37.5-25 MG per tablet 45 tablet 0     Sig: Take 0.5 tablets by mouth Daily.    LORazepam (ATIVAN) 1 MG tablet 60 tablet 0     Sig: Take 1 tablet by mouth 2 (Two) Times a Day As Needed. for anxiety        Pharmacy where request should be sent: [id number] - [place], [place] - [address] [phone] [facility] [phone]      Last office visit with prescribing clinician: Visit date not found   Last telemedicine visit with prescribing clinician: Visit date not found   Next office visit with prescribing clinician: Visit date not found         Does the patient have less than a 3 day supply:  [x] Yes  [] No    Would you like a call back once the refill request has been completed: [] Yes [x] No    If the office needs to give you a call back, can they leave a voicemail: [] Yes [x] No    Osei Navarro   08/30/23 09:30 EDT

## 2023-08-31 ENCOUNTER — OFFICE VISIT (OUTPATIENT)
Dept: FAMILY MEDICINE CLINIC | Facility: CLINIC | Age: 58
End: 2023-08-31
Payer: COMMERCIAL

## 2023-08-31 VITALS
WEIGHT: 147.6 LBS | SYSTOLIC BLOOD PRESSURE: 108 MMHG | DIASTOLIC BLOOD PRESSURE: 70 MMHG | HEART RATE: 70 BPM | TEMPERATURE: 98.6 F | HEIGHT: 69 IN | BODY MASS INDEX: 21.86 KG/M2

## 2023-08-31 DIAGNOSIS — F41.9 ANXIETY: ICD-10-CM

## 2023-08-31 DIAGNOSIS — I10 ESSENTIAL HYPERTENSION: Primary | ICD-10-CM

## 2023-08-31 DIAGNOSIS — Z51.81 THERAPEUTIC DRUG MONITORING: ICD-10-CM

## 2023-08-31 RX ORDER — LORAZEPAM 1 MG/1
1 TABLET ORAL 2 TIMES DAILY PRN
Qty: 60 TABLET | Refills: 0 | Status: SHIPPED | OUTPATIENT
Start: 2023-08-31

## 2023-08-31 RX ORDER — LISINOPRIL 40 MG/1
40 TABLET ORAL DAILY
Qty: 90 TABLET | Refills: 1 | Status: SHIPPED | OUTPATIENT
Start: 2023-08-31

## 2023-08-31 RX ORDER — SERTRALINE HYDROCHLORIDE 25 MG/1
25 TABLET, FILM COATED ORAL DAILY
Qty: 90 TABLET | Refills: 1 | Status: SHIPPED | OUTPATIENT
Start: 2023-08-31

## 2023-08-31 RX ORDER — TRIAMTERENE AND HYDROCHLOROTHIAZIDE 37.5; 25 MG/1; MG/1
0.5 TABLET ORAL DAILY
Qty: 45 TABLET | Refills: 1 | Status: SHIPPED | OUTPATIENT
Start: 2023-08-31

## 2023-08-31 NOTE — PROGRESS NOTES
Subjective     Chief Complaint  Hypertension (Med refill)    Subjective          Vesna Carl is a 57 y.o. female who presents today to [facility] for follow up.    HPI:   History of Present Illness    Ms. Carl is a very pleasant 57-year-old female who presents today to follow-up on hypertension.  Her blood pressure is 108/70 in office today, she is currently prescribed lisinopril 40 mg daily and triamterene/HCTZ 37.5-25 mg daily. She has lost 15 lbs in the last 7 weeks!! She is feeling better with better energy etc!!     Has a past medical history of anxiety and insomnia, for which she is prescribed Trintellix 10 mg daily and Ativan 1 mg daily as needed.    The following portions of the patient's history were reviewed and updated as appropriate: allergies, current medications, past family history, past medical history, past social history, past surgical history and problem list.    Objective     Objective     Allergy:   No Known Allergies     Current Medications:   Current Outpatient Medications   Medication Sig Dispense Refill    Trintellix 10 MG tablet tablet TAKE 1 TABLET BY MOUTH DAILY 90 tablet 1    lisinopril (PRINIVIL,ZESTRIL) 40 MG tablet Take 1 tablet by mouth Daily. 90 tablet 1    LORazepam (ATIVAN) 1 MG tablet Take 1 tablet by mouth 2 (Two) Times a Day As Needed for Anxiety. for anxiety 60 tablet 0    sertraline (Zoloft) 25 MG tablet Take 1 tablet by mouth Daily. 90 tablet 1    triamterene-hydrochlorothiazide (MAXZIDE-25) 37.5-25 MG per tablet Take 0.5 tablets by mouth Daily. 45 tablet 1     No current facility-administered medications for this visit.       Past Medical History:  Past Medical History:   Diagnosis Date    Anxiety     Hypertension        Past Surgical History:  History reviewed. No pertinent surgical history.    Social History:  Social History     Socioeconomic History    Marital status:    Tobacco Use    Smoking status: Never     Passive  "exposure: Never    Smokeless tobacco: Never   Substance and Sexual Activity    Alcohol use: Yes     Comment: ocassionally     Drug use: No    Sexual activity: Defer       Family History:  History reviewed. No pertinent family history.      Vital Signs:   /70   Pulse 70   Temp 98.6 øF (37 øC) (Infrared)   Ht 175.3 cm (69.02\")   Wt 67 kg (147 lb 9.6 oz)   BMI 21.79 kg/mý      Physical Exam:  Physical Exam  Constitutional:       Appearance: She is not ill-appearing.   Eyes:      Pupils: Pupils are equal, round, and reactive to light.   Cardiovascular:      Rate and Rhythm: Normal rate.      Pulses: Normal pulses.   Pulmonary:      Effort: Pulmonary effort is normal.      Breath sounds: Normal breath sounds.   Neurological:      General: No focal deficit present.      Mental Status: She is alert. Mental status is at baseline.   Psychiatric:         Mood and Affect: Mood normal.         Thought Content: Thought content normal.             PHQ-9 Score  PHQ-9 Total Score: 0     Lab Review  No visits with results within 3 Month(s) from this visit.   Latest known visit with results is:   Lab on 01/06/2022   Component Date Value Ref Range Status    WBC 01/06/2022 7.73  3.40 - 10.80 10*3/mm3 Final    RBC 01/06/2022 4.48  3.77 - 5.28 10*6/mm3 Final    Hemoglobin 01/06/2022 13.6  12.0 - 15.9 g/dL Final    Hematocrit 01/06/2022 42.2  34.0 - 46.6 % Final    MCV 01/06/2022 94.2  79.0 - 97.0 fL Final    MCH 01/06/2022 30.4  26.6 - 33.0 pg Final    MCHC 01/06/2022 32.2  31.5 - 35.7 g/dL Final    RDW 01/06/2022 12.7  12.3 - 15.4 % Final    RDW-SD 01/06/2022 44.2  37.0 - 54.0 fl Final    MPV 01/06/2022 10.1  6.0 - 12.0 fL Final    Platelets 01/06/2022 412  140 - 450 10*3/mm3 Final    Glucose 01/06/2022 93  65 - 99 mg/dL Final    BUN 01/06/2022 19  6 - 20 mg/dL Final    Creatinine 01/06/2022 0.95  0.57 - 1.00 mg/dL Final    Sodium 01/06/2022 133 (L)  136 - 145 mmol/L Final    Potassium 01/06/2022 4.6  3.5 - 5.2 mmol/L Final "    Chloride 01/06/2022 96 (L)  98 - 107 mmol/L Final    CO2 01/06/2022 27.0  22.0 - 29.0 mmol/L Final    Calcium 01/06/2022 10.2  8.6 - 10.5 mg/dL Final    Total Protein 01/06/2022 7.7  6.0 - 8.5 g/dL Final    Albumin 01/06/2022 4.70  3.50 - 5.20 g/dL Final    ALT (SGPT) 01/06/2022 12  1 - 33 U/L Final    AST (SGOT) 01/06/2022 20  1 - 32 U/L Final    Alkaline Phosphatase 01/06/2022 47  39 - 117 U/L Final    Total Bilirubin 01/06/2022 0.2  0.0 - 1.2 mg/dL Final    eGFR Non  Amer 01/06/2022 61  >60 mL/min/1.73 Final    Globulin 01/06/2022 3.0  gm/dL Final    Calculated Result    A/G Ratio 01/06/2022 1.6  g/dL Final    BUN/Creatinine Ratio 01/06/2022 20.0  7.0 - 25.0 Final    Anion Gap 01/06/2022 10.0  5.0 - 15.0 mmol/L Final    Total Cholesterol 01/06/2022 224 (H)  0 - 200 mg/dL Final    Triglycerides 01/06/2022 125  0 - 150 mg/dL Final    HDL Cholesterol 01/06/2022 72 (H)  40 - 60 mg/dL Final    LDL Cholesterol  01/06/2022 130 (H)  0 - 100 mg/dL Final    VLDL Cholesterol 01/06/2022 22  5 - 40 mg/dL Final    LDL/HDL Ratio 01/06/2022 1.76   Final    TSH 01/06/2022 3.130  0.270 - 4.200 uIU/mL Final        Radiology Results        Assessment / Plan         Assessment and Plan   Diagnoses and all orders for this visit:    1. Essential hypertension (Primary)  Assessment & Plan:  Hypertension is improving with lifestyle modifications.  Continue current treatment regimen.  Dietary sodium restriction.  Continue current medications.  Ambulatory blood pressure monitoring.  Blood pressure will be reassessed at the next regular appointment.      Orders:  -     lisinopril (PRINIVIL,ZESTRIL) 40 MG tablet; Take 1 tablet by mouth Daily.  Dispense: 90 tablet; Refill: 1  -     triamterene-hydrochlorothiazide (MAXZIDE-25) 37.5-25 MG per tablet; Take 0.5 tablets by mouth Daily.  Dispense: 45 tablet; Refill: 1    2. Anxiety  Assessment & Plan:  Patient would like to taper from Trintellix as it is very expensive for a co-pay.  She  is to take Trintellix 5 mg daily for the next 3 weeks then 5 mg daily every other day for 1 week on the off days she is to start Zoloft and then continue Zoloft as prescribed.    The patient is taking the following controlled substance(s) on a short term (less than or equal to three months) basis:  Ativan .  She is taking controlled substances for anxiety.  A history was obtained from the patient and the following were reviewed: family history, social history, psychiatric history, and substance abuse history.  A baseline assessment was performed and includes a controlled substance agreement, urine drug screen, HARJIT (within 12 months prior to today's encounter), and a physical exam, if appropriate, was performed within the past year.  It is medically appropriate to prescribe her the medication(s) above.  If applicable, prior treatment records were obtained and reviewed to justify long term prescribing of controlled substances.  The patient was educated on the following: Limited use of the medication, need to discontinue the medication when her condition resolves, proper storage and disposal of medication(s), and risks and dangers associated with controlled substances including tolerance, dependence, and addiction.  A written informed consent was obtained and includes objectives of treatment, further diagnostic testing if appropriate, and an exit strategy for discontinuing the medication on the condition resolves, if appropriate.  In addition, if appropriate, the patient will be screened for other medical conditions that may impact the prescribing of controlled substances.  Previous treatments have been tried including trials of noncontrolled substances or lower doses of controlled substances.  The controlled medication(s) have been titrated to the level appropriate and necessary and the medication(s) are not causing unacceptable side effects.        Orders:  -     sertraline (Zoloft) 25 MG tablet; Take 1 tablet by  mouth Daily.  Dispense: 90 tablet; Refill: 1  -     LORazepam (ATIVAN) 1 MG tablet; Take 1 tablet by mouth 2 (Two) Times a Day As Needed for Anxiety. for anxiety  Dispense: 60 tablet; Refill: 0    3. Therapeutic drug monitoring  -     Cancel: Compliance Drug Analysis, Ur - Urine, Clean Catch; Future  -     Compliance Drug Analysis, Ur - Urine, Clean Catch  -     Compliance Drug Analysis, Ur - Urine, Clean Catch; Future  -     Compliance Drug Analysis, Ur - Urine, Clean Catch        Discussed possible differential diagnoses, testing, treatment, recommended non-pharmacological interventions, risks, warning signs to monitor for that would indicate need for follow-up in clinic or ER. If no improvement with these regimens or you have new or worsening symptoms follow-up. Patient verbalizes understanding and agreement with plan of care. Denies further needs or concerns.     Patient was given instructions and counseling regarding her condition and for health maintenance advised.    BMI is within normal parameters. No other follow-up for BMI required.          Health Maintenance  Health Maintenance:   Health Maintenance Due   Topic Date Due    MAMMOGRAM  Never done    COLORECTAL CANCER SCREENING  Never done    TDAP/TD VACCINES (1 - Tdap) Never done    HEPATITIS C SCREENING  Never done    ANNUAL PHYSICAL  Never done    PAP SMEAR  Never done    ZOSTER VACCINE (1 of 2) Never done        Meds ordered during this visit  New Medications Ordered This Visit   Medications    lisinopril (PRINIVIL,ZESTRIL) 40 MG tablet     Sig: Take 1 tablet by mouth Daily.     Dispense:  90 tablet     Refill:  1    triamterene-hydrochlorothiazide (MAXZIDE-25) 37.5-25 MG per tablet     Sig: Take 0.5 tablets by mouth Daily.     Dispense:  45 tablet     Refill:  1    sertraline (Zoloft) 25 MG tablet     Sig: Take 1 tablet by mouth Daily.     Dispense:  90 tablet     Refill:  1     Tapering from Trintellix    LORazepam (ATIVAN) 1 MG tablet     Sig: Take 1  tablet by mouth 2 (Two) Times a Day As Needed for Anxiety. for anxiety     Dispense:  60 tablet     Refill:  0       Meds stopped during this visit:  Medications Discontinued During This Encounter   Medication Reason    lisinopril (PRINIVIL,ZESTRIL) 40 MG tablet Reorder    triamterene-hydrochlorothiazide (MAXZIDE-25) 37.5-25 MG per tablet Reorder    LORazepam (ATIVAN) 1 MG tablet Reorder        Visit Diagnoses    ICD-10-CM ICD-9-CM   1. Essential hypertension  I10 401.9   2. Anxiety  F41.9 300.00   3. Therapeutic drug monitoring  Z51.81 V58.83       Patient was given instructions and counseling regarding her condition or for health maintenance advice. Please see specific information pulled into the AVS if appropriate.     Follow Up   Return in about 2 months (around 10/31/2023) for Annual.      This document has been electronically signed by Lori Calderon DO   August 31, 2023 12:31 EDT    Dictated Utilizing Dragon Dictation: Part of this note may be an electronic transcription/translation of spoken language to printed text using the Dragon Dictation System.    Lori Calderon D.O.  [facility]

## 2023-08-31 NOTE — ASSESSMENT & PLAN NOTE
Hypertension is improving with lifestyle modifications.  Continue current treatment regimen.  Dietary sodium restriction.  Continue current medications.  Ambulatory blood pressure monitoring.  Blood pressure will be reassessed at the next regular appointment.

## 2023-08-31 NOTE — ASSESSMENT & PLAN NOTE
Patient would like to taper from Trintellix as it is very expensive for a co-pay.  She is to take Trintellix 5 mg daily for the next 3 weeks then 5 mg daily every other day for 1 week on the off days she is to start Zoloft and then continue Zoloft as prescribed.    The patient is taking the following controlled substance(s) on a short term (less than or equal to three months) basis:  Ativan .  She is taking controlled substances for anxiety.  A history was obtained from the patient and the following were reviewed: family history, social history, psychiatric history, and substance abuse history.  A baseline assessment was performed and includes a controlled substance agreement, urine drug screen, HARJIT (within 12 months prior to today's encounter), and a physical exam, if appropriate, was performed within the past year.  It is medically appropriate to prescribe her the medication(s) above.  If applicable, prior treatment records were obtained and reviewed to justify long term prescribing of controlled substances.  The patient was educated on the following: Limited use of the medication, need to discontinue the medication when her condition resolves, proper storage and disposal of medication(s), and risks and dangers associated with controlled substances including tolerance, dependence, and addiction.  A written informed consent was obtained and includes objectives of treatment, further diagnostic testing if appropriate, and an exit strategy for discontinuing the medication on the condition resolves, if appropriate.  In addition, if appropriate, the patient will be screened for other medical conditions that may impact the prescribing of controlled substances.  Previous treatments have been tried including trials of noncontrolled substances or lower doses of controlled substances.  The controlled medication(s) have been titrated to the level appropriate and necessary and the medication(s) are not causing unacceptable  side effects.

## 2023-09-05 DIAGNOSIS — I10 ESSENTIAL HYPERTENSION: ICD-10-CM

## 2023-09-05 RX ORDER — LISINOPRIL 40 MG/1
40 TABLET ORAL DAILY
Qty: 90 TABLET | Refills: 1 | Status: CANCELLED | OUTPATIENT
Start: 2023-09-05

## 2023-09-09 LAB — DRUGS UR: NORMAL

## 2023-11-28 DIAGNOSIS — F41.9 ANXIETY: ICD-10-CM

## 2024-02-26 DIAGNOSIS — I10 ESSENTIAL HYPERTENSION: ICD-10-CM

## 2024-02-26 RX ORDER — LISINOPRIL 40 MG/1
40 TABLET ORAL DAILY
Qty: 90 TABLET | Refills: 1 | Status: SHIPPED | OUTPATIENT
Start: 2024-02-26

## 2024-02-26 RX ORDER — TRIAMTERENE AND HYDROCHLOROTHIAZIDE 37.5; 25 MG/1; MG/1
0.5 TABLET ORAL DAILY
Qty: 45 TABLET | Refills: 1 | Status: SHIPPED | OUTPATIENT
Start: 2024-02-26

## 2024-03-12 DIAGNOSIS — F41.9 ANXIETY: ICD-10-CM

## 2024-03-12 RX ORDER — SERTRALINE HYDROCHLORIDE 25 MG/1
25 TABLET, FILM COATED ORAL DAILY
Qty: 90 TABLET | Refills: 1 | OUTPATIENT
Start: 2024-03-12

## 2024-03-12 RX ORDER — LORAZEPAM 1 MG/1
1 TABLET ORAL 2 TIMES DAILY PRN
Qty: 60 TABLET | Refills: 0 | OUTPATIENT
Start: 2024-03-12

## 2024-03-12 NOTE — TELEPHONE ENCOUNTER
Rx Refill Note  Requested Prescriptions     Pending Prescriptions Disp Refills    sertraline (Zoloft) 25 MG tablet 90 tablet 1     Sig: Take 1 tablet by mouth Daily.    LORazepam (ATIVAN) 1 MG tablet 60 tablet 0     Sig: Take 1 tablet by mouth 2 (Two) Times a Day As Needed for Anxiety. for anxiety      Last office visit with prescribing clinician: 8/31/2023   Last telemedicine visit with prescribing clinician: Visit date not found   Next office visit with prescribing clinician: Visit date not found                         Would you like a call back once the refill request has been completed: [] Yes [] No    If the office needs to give you a call back, can they leave a voicemail: [] Yes [] No    Cheyanne Lu LPN  03/12/24, 16:04 EDT

## 2024-03-12 NOTE — TELEPHONE ENCOUNTER
This patient has not been seen in over 6 months and will need to schedule an appointment for an an appointment. Once appointment has been scheduled I will send in enough medication to make it to the appointment time. Thank you, Dr. Calderon

## 2024-03-12 NOTE — TELEPHONE ENCOUNTER
"Hub read: \"  This patient has not been seen in over 6 months and will need to schedule an appointment for an an appointment. Once appointment has been scheduled I will send in enough medication to make it to the appointment time. Thank you, Dr. Calderon    \" I lm with  to have patient call us so that we can let her know this.  "

## 2024-03-15 NOTE — TELEPHONE ENCOUNTER
Caller: Vensa Carl    Relationship: Self    Best call back number: [phone]     What is the best time to reach you: ANY    Who are you requesting to speak with (clinical staff, provider,  specific staff member): NURSE    Do you know the name of the person who called: PATIENT    What was the call regarding: PATIENT RETURNED PHONE CALL.      Is it okay if the provider responds through MyChart: CALL BACK PLEASE

## 2024-03-17 DIAGNOSIS — F41.9 ANXIETY: ICD-10-CM

## 2024-03-17 RX ORDER — SERTRALINE HYDROCHLORIDE 25 MG/1
25 TABLET, FILM COATED ORAL DAILY
Qty: 90 TABLET | Refills: 1 | Status: SHIPPED | OUTPATIENT
Start: 2024-03-17

## 2024-03-17 RX ORDER — LORAZEPAM 1 MG/1
1 TABLET ORAL 2 TIMES DAILY PRN
Qty: 60 TABLET | Refills: 0 | Status: SHIPPED | OUTPATIENT
Start: 2024-03-17

## 2024-08-06 ENCOUNTER — OFFICE VISIT (OUTPATIENT)
Dept: FAMILY MEDICINE CLINIC | Facility: CLINIC | Age: 59
End: 2024-08-06
Payer: COMMERCIAL

## 2024-08-06 ENCOUNTER — LAB (OUTPATIENT)
Dept: LAB | Facility: HOSPITAL | Age: 59
End: 2024-08-06
Payer: COMMERCIAL

## 2024-08-06 VITALS
HEART RATE: 78 BPM | WEIGHT: 147 LBS | OXYGEN SATURATION: 98 % | TEMPERATURE: 97 F | SYSTOLIC BLOOD PRESSURE: 126 MMHG | BODY MASS INDEX: 21.77 KG/M2 | DIASTOLIC BLOOD PRESSURE: 78 MMHG | HEIGHT: 69 IN

## 2024-08-06 DIAGNOSIS — Z12.31 ENCOUNTER FOR SCREENING MAMMOGRAM FOR MALIGNANT NEOPLASM OF BREAST: ICD-10-CM

## 2024-08-06 DIAGNOSIS — F41.9 ANXIETY: ICD-10-CM

## 2024-08-06 DIAGNOSIS — Z11.59 NEED FOR HEPATITIS C SCREENING TEST: ICD-10-CM

## 2024-08-06 DIAGNOSIS — I10 ESSENTIAL HYPERTENSION: ICD-10-CM

## 2024-08-06 DIAGNOSIS — Z00.00 ENCOUNTER FOR ANNUAL PHYSICAL EXAM: ICD-10-CM

## 2024-08-06 DIAGNOSIS — Z12.4 PAP SMEAR FOR CERVICAL CANCER SCREENING: ICD-10-CM

## 2024-08-06 DIAGNOSIS — Z00.00 ENCOUNTER FOR ANNUAL PHYSICAL EXAM: Primary | ICD-10-CM

## 2024-08-06 DIAGNOSIS — Z23 NEED FOR VACCINATION: ICD-10-CM

## 2024-08-06 LAB
25(OH)D3 SERPL-MCNC: 78.8 NG/ML (ref 30–100)
ALBUMIN SERPL-MCNC: 4.9 G/DL (ref 3.5–5.2)
ALBUMIN/GLOB SERPL: 1.7 G/DL
ALP SERPL-CCNC: 42 U/L (ref 39–117)
ALT SERPL W P-5'-P-CCNC: 12 U/L (ref 1–33)
ANION GAP SERPL CALCULATED.3IONS-SCNC: 13 MMOL/L (ref 5–15)
AST SERPL-CCNC: 19 U/L (ref 1–32)
BASOPHILS # BLD AUTO: 0.06 10*3/MM3 (ref 0–0.2)
BASOPHILS NFR BLD AUTO: 0.9 % (ref 0–1.5)
BILIRUB SERPL-MCNC: 0.2 MG/DL (ref 0–1.2)
BUN SERPL-MCNC: 18 MG/DL (ref 6–20)
BUN/CREAT SERPL: 20.9 (ref 7–25)
CALCIUM SPEC-SCNC: 10 MG/DL (ref 8.6–10.5)
CHLORIDE SERPL-SCNC: 96 MMOL/L (ref 98–107)
CHOLEST SERPL-MCNC: 239 MG/DL (ref 0–200)
CO2 SERPL-SCNC: 25 MMOL/L (ref 22–29)
CREAT SERPL-MCNC: 0.86 MG/DL (ref 0.57–1)
DEPRECATED RDW RBC AUTO: 40.8 FL (ref 37–54)
EGFRCR SERPLBLD CKD-EPI 2021: 79.4 ML/MIN/1.73
EOSINOPHIL # BLD AUTO: 0.17 10*3/MM3 (ref 0–0.4)
EOSINOPHIL NFR BLD AUTO: 2.7 % (ref 0.3–6.2)
ERYTHROCYTE [DISTWIDTH] IN BLOOD BY AUTOMATED COUNT: 12.2 % (ref 12.3–15.4)
GLOBULIN UR ELPH-MCNC: 2.9 GM/DL
GLUCOSE SERPL-MCNC: 77 MG/DL (ref 65–99)
HBA1C MFR BLD: 5.2 % (ref 4.8–5.6)
HCT VFR BLD AUTO: 41.3 % (ref 34–46.6)
HCV AB SER QL: NORMAL
HDLC SERPL-MCNC: 76 MG/DL (ref 40–60)
HGB BLD-MCNC: 13.9 G/DL (ref 12–15.9)
IMM GRANULOCYTES # BLD AUTO: 0.03 10*3/MM3 (ref 0–0.05)
IMM GRANULOCYTES NFR BLD AUTO: 0.5 % (ref 0–0.5)
LDLC SERPL CALC-MCNC: 146 MG/DL (ref 0–100)
LDLC/HDLC SERPL: 1.89 {RATIO}
LYMPHOCYTES # BLD AUTO: 1.83 10*3/MM3 (ref 0.7–3.1)
LYMPHOCYTES NFR BLD AUTO: 28.7 % (ref 19.6–45.3)
MCH RBC QN AUTO: 31.1 PG (ref 26.6–33)
MCHC RBC AUTO-ENTMCNC: 33.7 G/DL (ref 31.5–35.7)
MCV RBC AUTO: 92.4 FL (ref 79–97)
MONOCYTES # BLD AUTO: 0.55 10*3/MM3 (ref 0.1–0.9)
MONOCYTES NFR BLD AUTO: 8.6 % (ref 5–12)
NEUTROPHILS NFR BLD AUTO: 3.73 10*3/MM3 (ref 1.7–7)
NEUTROPHILS NFR BLD AUTO: 58.6 % (ref 42.7–76)
NRBC BLD AUTO-RTO: 0 /100 WBC (ref 0–0.2)
PLATELET # BLD AUTO: 321 10*3/MM3 (ref 140–450)
PMV BLD AUTO: 9.6 FL (ref 6–12)
POTASSIUM SERPL-SCNC: 3.8 MMOL/L (ref 3.5–5.2)
PROT SERPL-MCNC: 7.8 G/DL (ref 6–8.5)
RBC # BLD AUTO: 4.47 10*6/MM3 (ref 3.77–5.28)
SODIUM SERPL-SCNC: 134 MMOL/L (ref 136–145)
TRIGL SERPL-MCNC: 96 MG/DL (ref 0–150)
TSH SERPL DL<=0.05 MIU/L-ACNC: 1.62 UIU/ML (ref 0.27–4.2)
VIT B12 BLD-MCNC: 1343 PG/ML (ref 211–946)
VLDLC SERPL-MCNC: 17 MG/DL (ref 5–40)
WBC NRBC COR # BLD AUTO: 6.37 10*3/MM3 (ref 3.4–10.8)

## 2024-08-06 PROCEDURE — 99396 PREV VISIT EST AGE 40-64: CPT | Performed by: FAMILY MEDICINE

## 2024-08-06 PROCEDURE — 86803 HEPATITIS C AB TEST: CPT

## 2024-08-06 PROCEDURE — 80050 GENERAL HEALTH PANEL: CPT

## 2024-08-06 PROCEDURE — 83036 HEMOGLOBIN GLYCOSYLATED A1C: CPT

## 2024-08-06 PROCEDURE — 80061 LIPID PANEL: CPT

## 2024-08-06 PROCEDURE — 99214 OFFICE O/P EST MOD 30 MIN: CPT | Performed by: FAMILY MEDICINE

## 2024-08-06 PROCEDURE — 90715 TDAP VACCINE 7 YRS/> IM: CPT | Performed by: FAMILY MEDICINE

## 2024-08-06 PROCEDURE — 82306 VITAMIN D 25 HYDROXY: CPT

## 2024-08-06 PROCEDURE — 36415 COLL VENOUS BLD VENIPUNCTURE: CPT

## 2024-08-06 PROCEDURE — 82607 VITAMIN B-12: CPT

## 2024-08-06 PROCEDURE — 90471 IMMUNIZATION ADMIN: CPT | Performed by: FAMILY MEDICINE

## 2024-08-06 RX ORDER — TRIAMTERENE AND HYDROCHLOROTHIAZIDE 37.5; 25 MG/1; MG/1
0.5 TABLET ORAL DAILY
Qty: 45 TABLET | Refills: 1 | Status: SHIPPED | OUTPATIENT
Start: 2024-08-06

## 2024-08-06 RX ORDER — LORAZEPAM 1 MG/1
1 TABLET ORAL 2 TIMES DAILY PRN
Qty: 60 TABLET | Refills: 2 | Status: SHIPPED | OUTPATIENT
Start: 2024-08-06

## 2024-08-06 RX ORDER — SERTRALINE HYDROCHLORIDE 25 MG/1
25 TABLET, FILM COATED ORAL DAILY
Qty: 90 TABLET | Refills: 1 | Status: SHIPPED | OUTPATIENT
Start: 2024-08-06

## 2024-08-06 RX ORDER — LISINOPRIL 40 MG/1
40 TABLET ORAL DAILY
Qty: 90 TABLET | Refills: 1 | Status: SHIPPED | OUTPATIENT
Start: 2024-08-06

## 2024-08-06 NOTE — LETTER
[facility]  Vaccine Consent Form    Patient Name:  Vesna Carl  Patient :  1965     Vaccine(s) Ordered    Tdap Vaccine Greater Than or Equal To 6yo IM        Screening Checklist  The following questions should be completed prior to vaccination. If you answer “yes” to any question, it does not necessarily mean you should not be vaccinated. It just means we may need to clarify or ask more questions. If a question is unclear, please ask your healthcare provider to explain it.    Yes No   Any fever or moderate to severe illness today (mild illness and/or antibiotic treatment are not contraindications)?     Do you have a history of a serious reaction to any previous vaccinations, such as anaphylaxis, encephalopathy within 7 days, Guillain-Plainfield syndrome within 6 weeks, seizure?     Have you received any live vaccine(s) (e.g MMR, KEYUR) or any other vaccines in the last month (to ensure duplicate doses aren't given)?     Do you have an anaphylactic allergy to latex (DTaP, DTaP-IPV, Hep A, Hep B, MenB, RV, Td, Tdap), baker’s yeast (Hep B, HPV), polysorbates (RSV, nirsevimab, PCV 20, Rotavirrus, Tdap, Shingrix), or gelatin (KEYUR, MMR)?     Do you have an anaphylactic allergy to neomycin (Rabies, KEYUR, MMR, IPV, Hep A), polymyxin B (IPV), or streptomycin (IPV)?      Any cancer, leukemia, AIDS, or other immune system disorder? (KEYUR, MMR, RV)     Do you have a parent, brother, or sister with an immune system problem (if immune competence of vaccine recipient clinically verified, can proceed)? (MMR, KEYUR)     Any recent steroid treatments for >2 weeks, chemotherapy, or radiation treatment? (KEYUR, MMR)     Have you received antibody-containing blood transfusions or IVIG in the past 11 months (recommended interval is dependent on product)? (MMR, KEYUR)     Have you taken antiviral drugs (acyclovir, famciclovir, valacyclovir for KEYUR) in the last 24 or 48 hours, respectively?      Are you pregnant or planning to become pregnant  "within 1 month? (KEYUR, MMR, HPV, IPV, MenB, Abrexvy; For Hep B- refer to Engerix-B; For RSV - Abrysvo is indicated for 32-36 weeks of pregnancy from September to January)     For infants, have you ever been told your child has had intussusception or a medical emergency involving obstruction of the intestine (Rotavirus)? If not for an infant, can skip this question.         *Ordering Physicians/APC should be consulted if \"yes\" is checked by the patient or guardian above.  I have received, read, and understand the Vaccine Information Statement (VIS) for each vaccine ordered.  I have considered my or my child's health status as well as the health status of my close contacts.  I have taken the opportunity to discuss my vaccine questions with my or my child's health care provider.   I have requested that the ordered vaccine(s) be given to me or my child.  I understand the benefits and risks of the vaccines.  I understand that I should remain in the clinic for 15 minutes after receiving the vaccine(s).  _________________________________________________________  Signature of Patient or Parent/Legal Guardian ____________________  Date     "

## 2024-08-06 NOTE — PROGRESS NOTES
Subjective     Chief Complaint  Hypertension    Subjective          Vesna Carl is a 58 y.o. female who presents today to [facility] for follow up.    HPI:   Hypertension      Ms. Carl is a very pleasant 58-year-old female who presents today to follow-up on chronic issues and annual physical exam.     Her blood pressure is 126/78 in office today, she is currently prescribed lisinopril 40 mg daily and triamterene/HCTZ 37.5-25 mg daily.     Has a past medical history of anxiety and insomnia, for which she is prescribed Trintellix 10 mg daily and Ativan 1 mg daily as needed.    The following portions of the patient's history were reviewed and updated as appropriate: allergies, current medications, past family history, past medical history, past social history, past surgical history and problem list.    Objective     Objective     Allergy:   No Known Allergies     Current Medications:   Current Outpatient Medications   Medication Sig Dispense Refill    lisinopril (PRINIVIL,ZESTRIL) 40 MG tablet Take 1 tablet by mouth Daily. 90 tablet 1    LORazepam (ATIVAN) 1 MG tablet Take 1 tablet by mouth 2 (Two) Times a Day As Needed for Anxiety. for anxiety 60 tablet 2    sertraline (Zoloft) 25 MG tablet Take 1 tablet by mouth Daily. 90 tablet 1    triamterene-hydrochlorothiazide (MAXZIDE-25) 37.5-25 MG per tablet Take 0.5 tablets by mouth Daily. 45 tablet 1     No current facility-administered medications for this visit.       Past Medical History:  Past Medical History:   Diagnosis Date    Anxiety     Hypertension        Past Surgical History:  History reviewed. No pertinent surgical history.    Social History:  Social History     Socioeconomic History    Marital status:    Tobacco Use    Smoking status: Never     Passive exposure: Never    Smokeless tobacco: Never   Vaping Use    Vaping status: Never Used   Substance and Sexual Activity    Alcohol use: Yes     Alcohol/week: 3.0 - 4.0  "standard drinks of alcohol     Types: 3 - 4 Glasses of wine per week     Comment: ocassionally     Drug use: No    Sexual activity: Yes     Partners: Male     Comment: N/a  for 39 years       Family History:  History reviewed. No pertinent family history.      Vital Signs:   /78   Pulse 78   Temp 97 °F (36.1 °C) (Temporal)   Ht 175.3 cm (69.02\")   Wt 66.7 kg (147 lb)   SpO2 98%   BMI 21.70 kg/m²      Physical Exam:  Physical Exam  Constitutional:       Appearance: She is not ill-appearing.   Eyes:      Pupils: Pupils are equal, round, and reactive to light.   Cardiovascular:      Rate and Rhythm: Normal rate.      Pulses: Normal pulses.   Pulmonary:      Effort: Pulmonary effort is normal.      Breath sounds: Normal breath sounds.   Neurological:      General: No focal deficit present.      Mental Status: She is alert. Mental status is at baseline.   Psychiatric:         Mood and Affect: Mood normal.         Thought Content: Thought content normal.               PHQ-9 Score  PHQ-9 Total Score: 0     Lab Review  No visits with results within 3 Month(s) from this visit.   Latest known visit with results is:   Office Visit on 08/31/2023   Component Date Value Ref Range Status    Report Summary 08/31/2023 FINAL   Final    Comment: ====================================================================  [facility] DRUG ANALYSIS,[facility]  ====================================================================  Test                             Result       Flag       Units  Drug Present    Lorazepam                      429                     ng/mg creat     Source of lorazepam is a scheduled prescription medication.    Acetaminophen                  PRESENT    Salicylate                     PRESENT    Ibuprofen                      PRESENT  ====================================================================  Test                      Result    Flag   Units      Ref Range    Creatinine              38          "      mg/dL      >=20  ====================================================================  Declared Medications:   Medication list was not provided.  ====================================================================  For clinical consultation, please call [phone].  =======================================                           =============================          Radiology Results        Assessment / Plan         Assessment and Plan   Diagnoses and all orders for this visit:    1. Encounter for annual physical exam (Primary)  Assessment & Plan:  Annual wellness exam completed today. Health Maintenance including immunizations was updated and reflected in the chart. Yearly screening labs were ordered.     Further recommendations to be given once lab data received.     Health advice: healthy food choices with fresh fruits and vegetables, maintain sleep pattern at least 8 hours, avoid texting and distracted driving practices; wear safety belt, engage in regular exercise, maintain healthy weight, use safe sex practices, avoid alcohol and illicit drugs. Maintain immunizations that are up to date. Maintain health maintenance:Colonoscopy, Mammo, PAP, etc.  Follow up with PCP if struggling with depression or anxiety. Keep regular dental and eye exams. Brush and floss teeth daily.     I suggest they take a daily multivitamin that is age-appropriate (example women's One-A-Day.)  Patient voiced understanding of these instructions.     Follow up Annually for Physical       Orders:  -     CBC & Differential; Future  -     Comprehensive Metabolic Panel; Future  -     Hemoglobin A1c; Future  -     Lipid Panel; Future  -     TSH Rfx On Abnormal To Free T4; Future  -     Vitamin B12; Future  -     Vitamin D,25-Hydroxy; Future    2. Essential hypertension  Assessment & Plan:  Hypertension is stable and controlled  Continue current treatment regimen.  Dietary sodium restriction.  Regular aerobic exercise.  Ambulatory  blood pressure monitoring.  Blood pressure will be reassessed in 6 months.    Target BP <140/90    Orders:  -     triamterene-hydrochlorothiazide (MAXZIDE-25) 37.5-25 MG per tablet; Take 0.5 tablets by mouth Daily.  Dispense: 45 tablet; Refill: 1  -     lisinopril (PRINIVIL,ZESTRIL) 40 MG tablet; Take 1 tablet by mouth Daily.  Dispense: 90 tablet; Refill: 1    3. Anxiety  -     LORazepam (ATIVAN) 1 MG tablet; Take 1 tablet by mouth 2 (Two) Times a Day As Needed for Anxiety. for anxiety  Dispense: 60 tablet; Refill: 2  -     sertraline (Zoloft) 25 MG tablet; Take 1 tablet by mouth Daily.  Dispense: 90 tablet; Refill: 1    4. Encounter for screening mammogram for malignant neoplasm of breast  -     Mammo Screening Digital Tomosynthesis Bilateral With CAD; Future    5. Need for hepatitis C screening test  -     Hepatitis C Antibody; Future    6. Pap smear for cervical cancer screening  -     Ambulatory Referral to Gynecology    7. Need for vaccination  -     Tdap Vaccine Greater Than or Equal To 6yo IM          Discussed possible differential diagnoses, testing, treatment, recommended non-pharmacological interventions, risks, warning signs to monitor for that would indicate need for follow-up in clinic or ER. If no improvement with these regimens or you have new or worsening symptoms follow-up. Patient verbalizes understanding and agreement with plan of care. Denies further needs or concerns.     Patient was given instructions and counseling regarding her condition and for health maintenance advised.    BMI is within normal parameters. No other follow-up for BMI required.      Health Maintenance  Health Maintenance:   Health Maintenance Due   Topic Date Due    MAMMOGRAM  Never done    TDAP/TD VACCINES (1 - Tdap) Never done    HEPATITIS C SCREENING  Never done    PAP SMEAR  07/01/2024    INFLUENZA VACCINE  08/01/2024        Meds ordered during this visit  New Medications Ordered This Visit   Medications    LORazepam  (ATIVAN) 1 MG tablet     Sig: Take 1 tablet by mouth 2 (Two) Times a Day As Needed for Anxiety. for anxiety     Dispense:  60 tablet     Refill:  2    triamterene-hydrochlorothiazide (MAXZIDE-25) 37.5-25 MG per tablet     Sig: Take 0.5 tablets by mouth Daily.     Dispense:  45 tablet     Refill:  1    lisinopril (PRINIVIL,ZESTRIL) 40 MG tablet     Sig: Take 1 tablet by mouth Daily.     Dispense:  90 tablet     Refill:  1    sertraline (Zoloft) 25 MG tablet     Sig: Take 1 tablet by mouth Daily.     Dispense:  90 tablet     Refill:  1       Meds stopped during this visit:  Medications Discontinued During This Encounter   Medication Reason    Vortioxetine HBr (Trintellix) 10 MG tablet tablet Patient Reported Not Taking    triamterene-hydrochlorothiazide (MAXZIDE-25) 37.5-25 MG per tablet Reorder    lisinopril (PRINIVIL,ZESTRIL) 40 MG tablet Reorder    LORazepam (ATIVAN) 1 MG tablet Reorder    sertraline (Zoloft) 25 MG tablet Reorder        Visit Diagnoses    ICD-10-CM ICD-9-CM   1. Encounter for annual physical exam  Z00.00 V70.0   2. Essential hypertension  I10 401.9   3. Anxiety  F41.9 300.00   4. Encounter for screening mammogram for malignant neoplasm of breast  Z12.31 V76.12   5. Need for hepatitis C screening test  Z11.59 V73.89   6. Pap smear for cervical cancer screening  Z12.4 V76.2   7. Need for vaccination  Z23 V05.9         Patient was given instructions and counseling regarding her condition or for health maintenance advice. Please see specific information pulled into the AVS if appropriate.     Follow Up   Return in about 6 months (around 2/6/2025) for followup HTN, Anxiety .      This document has been electronically signed by Lori Calderon DO   August 6, 2024 10:41 EDT    Dictated Utilizing Dragon Dictation: Part of this note may be an electronic transcription/translation of spoken language to printed text using the Dragon Dictation System.    Lori Calderon D.O.  [facility]

## 2024-08-06 NOTE — ASSESSMENT & PLAN NOTE
Hypertension is stable and controlled  Continue current treatment regimen.  Dietary sodium restriction.  Regular aerobic exercise.  Ambulatory blood pressure monitoring.  Blood pressure will be reassessed in 6 months.    Target BP <140/90

## 2024-08-14 DIAGNOSIS — F41.9 ANXIETY: ICD-10-CM

## 2024-08-14 RX ORDER — SERTRALINE HYDROCHLORIDE 25 MG/1
25 TABLET, FILM COATED ORAL DAILY
Qty: 90 TABLET | Refills: 1 | Status: SHIPPED | OUTPATIENT
Start: 2024-08-14

## 2024-11-01 DIAGNOSIS — F41.9 ANXIETY: ICD-10-CM

## 2024-11-01 RX ORDER — LORAZEPAM 1 MG/1
1 TABLET ORAL 2 TIMES DAILY PRN
Qty: 60 TABLET | Refills: 2 | OUTPATIENT
Start: 2024-11-01

## 2025-01-23 ENCOUNTER — PATIENT MESSAGE (OUTPATIENT)
Dept: FAMILY MEDICINE CLINIC | Facility: CLINIC | Age: 60
End: 2025-01-23
Payer: COMMERCIAL